# Patient Record
Sex: FEMALE | Race: WHITE
[De-identification: names, ages, dates, MRNs, and addresses within clinical notes are randomized per-mention and may not be internally consistent; named-entity substitution may affect disease eponyms.]

---

## 2018-11-06 ENCOUNTER — HOSPITAL ENCOUNTER (INPATIENT)
Dept: HOSPITAL 36 - ER | Age: 73
LOS: 14 days | Discharge: NURSING, REHAB OR LONG-TERM CARE | DRG: 885 | End: 2018-11-20
Attending: PSYCHIATRY & NEUROLOGY | Admitting: PSYCHIATRY & NEUROLOGY
Payer: MEDICARE

## 2018-11-06 VITALS — SYSTOLIC BLOOD PRESSURE: 121 MMHG | DIASTOLIC BLOOD PRESSURE: 54 MMHG

## 2018-11-06 DIAGNOSIS — E78.5: ICD-10-CM

## 2018-11-06 DIAGNOSIS — N18.9: ICD-10-CM

## 2018-11-06 DIAGNOSIS — K21.9: ICD-10-CM

## 2018-11-06 DIAGNOSIS — F03.90: ICD-10-CM

## 2018-11-06 DIAGNOSIS — F29: ICD-10-CM

## 2018-11-06 DIAGNOSIS — Z82.49: ICD-10-CM

## 2018-11-06 DIAGNOSIS — Z91.14: ICD-10-CM

## 2018-11-06 DIAGNOSIS — K27.9: ICD-10-CM

## 2018-11-06 DIAGNOSIS — F31.5: Primary | ICD-10-CM

## 2018-11-06 DIAGNOSIS — I12.9: ICD-10-CM

## 2018-11-06 DIAGNOSIS — Z83.3: ICD-10-CM

## 2018-11-06 DIAGNOSIS — E11.22: ICD-10-CM

## 2018-11-06 DIAGNOSIS — Z66: ICD-10-CM

## 2018-11-06 LAB
ALBUMIN SERPL-MCNC: 4 GM/DL (ref 3.7–5.3)
ALBUMIN/GLOB SERPL: 1.1 {RATIO} (ref 1–1.8)
ALP SERPL-CCNC: 72 U/L (ref 34–104)
ALT SERPL-CCNC: 13 U/L (ref 7–52)
ANION GAP SERPL CALC-SCNC: 13.4 MMOL/L (ref 7–16)
APAP SERPL-MCNC: < 10 UG/ML (ref 10–30)
AST SERPL-CCNC: 15 U/L (ref 13–39)
BASOPHILS # BLD AUTO: 0.1 TH/CUMM (ref 0–0.2)
BASOPHILS NFR BLD AUTO: 1 % (ref 0–2)
BILIRUB SERPL-MCNC: 0.3 MG/DL (ref 0.3–1)
BUN SERPL-MCNC: 25 MG/DL (ref 7–25)
CALCIUM SERPL-MCNC: 9.9 MG/DL (ref 8.6–10.3)
CHLORIDE SERPL-SCNC: 106 MEQ/L (ref 98–107)
CHOLEST SERPL-MCNC: 215 MG/DL (ref ?–200)
CO2 SERPL-SCNC: 26.2 MEQ/L (ref 21–31)
CREAT SERPL-MCNC: 1 MG/DL (ref 0.6–1.2)
EOSINOPHIL # BLD AUTO: 0.4 TH/CMM (ref 0.1–0.4)
EOSINOPHIL NFR BLD AUTO: 3.2 % (ref 0–5)
ERYTHROCYTE [DISTWIDTH] IN BLOOD BY AUTOMATED COUNT: 14.3 % (ref 11.5–20)
GLOBULIN SER-MCNC: 3.6 GM/DL
GLUCOSE SERPL-MCNC: 118 MG/DL (ref 70–105)
HCT VFR BLD CALC: 36.8 % (ref 41–60)
HDLC SERPL-MCNC: 45 MG/DL (ref 23–92)
HGB BLD-MCNC: 12.1 GM/DL (ref 12–16)
LYMPHOCYTE AB SER FC-ACNC: 4.9 TH/CMM (ref 1.5–3)
LYMPHOCYTES NFR BLD AUTO: 39.5 % (ref 20–50)
MCH RBC QN AUTO: 31.7 PG (ref 27–31)
MCHC RBC AUTO-ENTMCNC: 32.9 PG (ref 28–36)
MCV RBC AUTO: 96.4 FL (ref 81–100)
MONOCYTES # BLD AUTO: 0.8 TH/CMM (ref 0.3–1)
MONOCYTES NFR BLD AUTO: 6.3 % (ref 2–10)
NEUTROPHILS # BLD: 6.1 TH/CMM (ref 1.8–8)
NEUTROPHILS NFR BLD AUTO: 50 % (ref 40–80)
PLATELET # BLD: 375 TH/CMM (ref 150–400)
PMV BLD AUTO: 9.2 FL
POTASSIUM SERPL-SCNC: 3.6 MEQ/L (ref 3.5–5.1)
RBC # BLD AUTO: 3.82 MIL/CMM (ref 3.8–5.2)
SALICYLATES SERPL-MCNC: < 25 MG/L (ref 30–100)
SODIUM SERPL-SCNC: 142 MEQ/L (ref 136–145)
TRIGL SERPL-MCNC: 241 MG/DL (ref ?–150)
WBC # BLD AUTO: 12.3 TH/CMM (ref 4.8–10.8)

## 2018-11-06 PROCEDURE — Z7610: HCPCS

## 2018-11-06 RX ADMIN — INSULIN ASPART SCH: 100 INJECTION, SOLUTION INTRAVENOUS; SUBCUTANEOUS at 22:26

## 2018-11-06 NOTE — HISTORY & PHYSICAL
ADMIT DATE:  11/06/2018



HISTORY OF PRESENT ILLNESS:  The patient is a 72-year-old female with long

history of diabetes mellitus, hypertension, hyperlipidemia, dementia, psychosis,

admitted to [facility] under Dr. Preston's service for

evaluation and treatment.  The patient is a poor historian.  No fever, no

chills, no nausea, no vomiting.  The patient has been very agitated.



PAST MEDICAL HISTORY:  Significant for diabetes mellitus, hypertension,

hyperlipidemia, dementia, psychosis.



PAST SURGICAL HISTORY:  No recent surgery.



ALLERGIES:  None.



MEDICATIONS:  Follow admission reconciliation.



SOCIAL HISTORY:  No smoking, no alcohol, no drugs.



FAMILY HISTORY:  Noncontributory.



REVIEW OF SYSTEMS:

RENAL SYSTEM:  No history of chronic renal disorder.

CARDIOVASCULAR SYSTEM:  No coronary artery disease.

ENDOCRINE SYSTEM:  She has history of diabetes mellitus.

GASTROINTESTINAL SYSTEM:  No upper or lower gastrointestinal bleed.

NEUROLOGICAL SYSTEM:  No seizure disorder.

MUSCULOSKELETAL SYSTEM:  No muscular dystrophy.

HEMATOLOGAL SYSTEM:  No bleeding tendencies.

RESPIRATORY SYSTEM:  No asthma.

GENITOURINARY:  No dysuria or hematuria.



PHYSICAL EXAMINATION:

GENERAL:  She is awake, not coherent.

VITAL SIGNS:  Temperature ____, heart rate 71, blood pressure 167/90.

HEENT:  Normocephalic.  Pupils reactive to light and accommodation.  Sclerae

clear.

NECK:  Supple.  Negative for lymphadenopathy, JVD or bruit.

CHEST:  Entry of air bilaterally normal.  No rhonchi or wheezing.

HEART:  S1, S2 normal.  No murmur or gallop rhythm.

ABDOMEN:  Soft, bowel sounds positive.

EXTREMITIES:  No edema.

NEUROLOGIC:  She is awake, not coherent.  No focal motor or sensory deficit.



LABORATORY DATA:  White blood cell 12.3, hemoglobin 36.8, platelets 31.7. 

Sodium 142, potassium 3.6, BUN 25, creatinine 2.0, glucose 118, cholesterol 215.



ASSESSMENT:

1.  Diabetes mellitus.

2.  Hypertension.

3.  Hyperlipidemia.

4.  Psychosis.

5.  Dementia.



PLAN:  The patient will continue on current medication.  The patient was

admitted to the hospital under Dr. Preston's service.  Medical problem

addressed during this hospitalization:  Psychosis and dementia.  Medical

problems addressed at discharge:  Diabetes mellitus, hypertension,

hyperlipidemia.  The patient is medically stable for activity.



Thank you, Dr. Preston, for asking me to see your patient.  We will collect

urine for the UA, culture and sensitivity.  Also, we will start the patient on

sliding scale with regular insulin a.c. and at bedtime.  The patient is a full

code.





DD: 11/06/2018 20:36

DT: 11/06/2018 22:39

JOB# [id number]  [id number]

## 2018-11-06 NOTE — ED PHYSICIAN CHART
ED Chief Complaint/HPI





- Patient Information


Date Seen:: 11/06/18


Time Seen:: 17:25


Chief Complaint:: Agitation


History of Present Illness:: 





onset x 2 days of agitation and hostile behavior; no report of trauma, LOC, ALOC

, AMS, H/As, neck pain, C/P, SOB, SIs, Abd. Pain, A/N/V/D/C, fever, chills, or 

urinary s/s


Historian:: Patient, EMS


Review:: Nurse's Note Reviewed, Old Chart Reviewed, EMS run form Reviewed





ED Review of Systems





- Review of Systems


General/Constitutional: No fever, No chills, No weight loss, No weakness, No 

diaphoresis, No edema, No loss of appetite


Skin: No skin lesions, No rash, No bruising


Head: No headache, No light-headedness


Eyes: No loss of vision, No pain, No diplopia


ENT: No earache, No nasal drainage, No sore throat, No tinnitus


Neck: No neck pain, No swelling, No thyromegaly, No stiffness, No mass noted


Cardio Vascular: No chest pain, No palpitations, No PND, No orthopnea, No edema


Pulmonary: No SOB, No cough, No sputum, No wheezing


GI: No nausea, No vomiting, No diarrhea, No pain, No melena, No hematochezia, 

No constipation, No hematemesis


G/U: No dysuria, No frequency, No hematuria, No nacturia


Ob/Gyn: No vaginal discharge, No abnormal vaginal bleed, No contraction


Musculoskeletal: No bone or joint pain, No back pain, No muscle pain


Endocrine: No polyuria, No polydipsia


Psychiatric: Prior psych history, Depression, Anxiety, No suicidal ideation, No 

homicidal ideation, No auditory hallucination, No visual hallucination


Hematopoietic: No bruising, No lymphadenopathy


Allergic/Immuno: No urticaria, No angioedema


Neurological: No syncope, No focal symptoms, No weakness, No paresthesia, No 

headache, No seizure, No dizziness, Confusion, No vertigo





ED Past Medical History





- Past Medical History


Obtainable: Yes


Past Medical History: HTN, DM, PUD/GERD, ESRD, Dementia


Family History: Diabetes Melitus, HTN


Social History: Non Smoker, No Alcohol, No Drug Use, Single, Care Facility


Surgical History: None


Psychiatricy History: Depression, Bipolar, Dementia


Medication: Reviewed





Family Medical History





- Family Member


  ** Mother


History Unknown: Yes





ED Physical Exam





- Physical Examination


General/Constitutional: Awake, Well-developed, well-nourished, Alert, No 

distress, GCS 15, Non-toxic appearing, Ambulatory


Head: Atraumatic


Eyes: Lids, conjuctiva normal, PERRL, EOMI


Skin: Nl inspection, No rash, No skin lesions, No ecchymosis, Well hydrated, No 

lymphadenopathy


ENMT: External ears, nose nl, TM canals nl, Nasal exam nl, Lips, teeth, gums nl

, Oropharynx nl, Tonsils nl


Neck: Nontender, Full ROM w/o pain, No JVD, No nuchal rigidity, No bruit, No 

mass, No stridor


Respiratory: Nl effort/Exclusion, Clear to Auscultation, No Wheeze/Rhonchi/Rales


Cardio Vascular: RRR, No murmur, gallop, rubs, NL S1 S2, Carotid/Femoral/Distal 

pulses equal bilaterally


GI: No tenderness/rebounding/guarding, No organomegaly, No hernia, Normal BS's, 

Nondistended, No mass/bruits, No McBurney tenderness


: No CVA tenderness


Extremities: No tenderness or effusion, Full ROM, normal strength in all 

extremities, No edema, Normal digits & nails


Neuro/Psych: Alert/oriented, DTR's symmetric, Normal sensory exam, Normal motor 

strength, Judgement/insight normal, Mood normal, Normal gait, No focal deficits


Other Neuro/Psych comments:: 





+ Psychomotor Agitation; no SIs; Mood/Affect: Labile


Misc: Normal back, No paraspinal tenderness





ED Labs/Radiology/EKG Results





- Lab Results


Comments:: 





Reviewed





- EKG Interpretations


EKG Time:: 17:35


Rate & Rhythm: 76; NSR


Comments:: 





non-specific st-t changes





ED Septic Shock





- .


Is Septic Shock (SBP<90, OR Lactate>4 mmol\L) present?: No





ED Reassessment (Disposition)





- Reassessment


Reassessment Condition:: Improved





- Diagnosis


Diagnosis:: 





Agitation; Psychosis; Dementia; Medical Clearance; Bipolar Disorder





- Aftercare/Follow up Instructions


Aftercare/Follow-Up Instructions:: Counseled pt regarding lab results/diagnosis 

& need follow up, Counseled pt & family regarding lab results/diagnosis & need 

follow up





- Patient Disposition


Discharge/Transfer:: Acute Care w/in this hosp


Admitted to:: [facility]


Condition at Disposition:: Stable, Improved

## 2018-11-07 LAB
CHOLEST SERPL-MCNC: 200 MG/DL (ref ?–200)
HDLC SERPL-MCNC: 40 MG/DL (ref 23–92)
TRIGL SERPL-MCNC: 189 MG/DL (ref ?–150)

## 2018-11-07 RX ADMIN — INSULIN ASPART SCH: 100 INJECTION, SOLUTION INTRAVENOUS; SUBCUTANEOUS at 12:11

## 2018-11-07 RX ADMIN — INSULIN ASPART SCH: 100 INJECTION, SOLUTION INTRAVENOUS; SUBCUTANEOUS at 17:11

## 2018-11-07 RX ADMIN — INSULIN ASPART SCH: 100 INJECTION, SOLUTION INTRAVENOUS; SUBCUTANEOUS at 20:27

## 2018-11-07 RX ADMIN — INSULIN ASPART SCH: 100 INJECTION, SOLUTION INTRAVENOUS; SUBCUTANEOUS at 06:30

## 2018-11-07 NOTE — INTERNAL MEDICINE PROG NOTE
Internal Medicine Subjective





- Subjective


Service Date: 11/07/18


Patient seen and examined:: with staff


Patient is:: awake, verbal, in bed, talking, confused


Per staff patient has:: no adverse event





Internal Medicine Objective





- Results


Result Diagrams: 


 11/06/18 17:19





 11/06/18 17:19


Recent Labs: 


 Laboratory Last Values











WBC  12.3 Th/cmm (4.8-10.8)  H  11/06/18  17:19    


 


RBC  3.82 Mil/cmm (3.80-5.20)   11/06/18  17:19    


 


Hgb  12.1 gm/dL (12-16)   11/06/18  17:19    


 


Hct  36.8 % (41.0-60)  L  11/06/18  17:19    


 


MCV  96.4 fl ()   11/06/18  17:19    


 


MCH  31.7 pg (27.0-31.0)  H  11/06/18  17:19    


 


MCHC Differential  32.9 pg (28.0-36.0)   11/06/18  17:19    


 


RDW  14.3 % (11.5-20.0)   11/06/18  17:19    


 


Plt Count  375 Th/cmm (150-400)   11/06/18  17:19    


 


MPV  9.2 fl  11/06/18  17:19    


 


Neutrophils %  50.0 % (40.0-80.0)   11/06/18  17:19    


 


Lymphocytes %  39.5 % (20.0-50.0)   11/06/18  17:19    


 


Monocytes %  6.3 % (2.0-10.0)   11/06/18  17:19    


 


Eosinophils %  3.2 % (0.0-5.0)   11/06/18  17:19    


 


Basophils %  1.0 % (0.0-2.0)   11/06/18  17:19    


 


Sodium  142 mEq/L (136-145)   11/06/18  17:19    


 


Potassium  3.6 mEq/L (3.5-5.1)   11/06/18  17:19    


 


Chloride  106 mEq/L ()   11/06/18  17:19    


 


Carbon Dioxide  26.2 mEq/L (21.0-31.0)   11/06/18  17:19    


 


Anion Gap  13.4  (7.0-16.0)   11/06/18  17:19    


 


BUN  25 mg/dL (7-25)   11/06/18  17:19    


 


Creatinine  1.0 mg/dL (0.6-1.2)   11/06/18  17:19    


 


Est GFR ( Amer)  TNP   11/06/18  17:19    


 


Est GFR (Non-Af Amer)  TNP   11/06/18  17:19    


 


BUN/Creatinine Ratio  25.0   11/06/18  17:19    


 


Glucose  118 mg/dL ()  H  11/06/18  17:19    


 


POC Glucose  147 MG/DL (70 - 105)  H  11/07/18  19:45    


 


Calcium  9.9 mg/dL (8.6-10.3)   11/06/18  17:19    


 


Total Bilirubin  0.3 mg/dL (0.3-1.0)   11/06/18  17:19    


 


AST  15 U/L (13-39)   11/06/18  17:19    


 


ALT  13 U/L (7-52)   11/06/18  17:19    


 


Alkaline Phosphatase  72 U/L ()   11/06/18  17:19    


 


Troponin I  0.01 ng/mL (0.01-0.05)   11/06/18  17:19    


 


Total Protein  7.6 gm/dL (6.0-8.3)   11/06/18  17:19    


 


Albumin  4.0 gm/dL (3.7-5.3)   11/06/18  17:19    


 


Globulin  3.6 gm/dL  11/06/18  17:19    


 


Albumin/Globulin Ratio  1.1  (1.0-1.8)   11/06/18  17:19    


 


Triglycerides  189 mg/dL (<150)  H  11/07/18  06:05    


 


Cholesterol  200 mg/dL (<200)   11/07/18  06:05    


 


LDL Cholesterol Direct  143 mg/dL ()   11/07/18  06:05    


 


HDL Cholesterol  40 mg/dL (23-92)   11/07/18  06:05    


 


TSH  0.48 uIU/ml (0.34-5.60)   11/06/18  17:19    


 


Salicylates  < 25.0 mg/L (30.0-100.0)  L  11/06/18  17:19    


 


Acetaminophen  < 10.0 ug/mL (10.0-30.0)  L  11/06/18  17:19    


 


Ethyl Alcohol  < 10 mg/dL (0-10)   11/06/18  17:19    














- Physical Exam


Vitals and I&O: 


 Vital Signs











Temp  98.1 F   11/07/18 15:58


 


Pulse  79   11/07/18 15:58


 


Resp  20   11/07/18 15:58


 


BP  143/88   11/07/18 15:58


 


Pulse Ox  97   11/07/18 15:58








 Intake & Output











 11/07/18 11/07/18 11/08/18





 06:59 18:59 06:59


 


Intake Total 60  


 


Balance 60  


 


Intake:   


 


  Oral 60  


 


Other:   


 


  # Voids 1  


 


  # Bowel Movements 1  











Active Medications: 


Current Medications





Acetaminophen (Tylenol)  650 mg PO Q4HR PRN


   PRN Reason: Mild Pain / Temp above 100


   Stop: 01/05/19 20:40


   Last Admin: 11/07/18 11:11 Dose:  650 mg


Al Hydrox/Mg Hydrox/Simethicone (Maalox)  30 ml PO Q4HR PRN


   PRN Reason: GI DISTRESS


   Stop: 01/05/19 20:40


Amlodipine Besylate (Norvasc)  10 mg PO DAILY [facility]


   Stop: 01/06/19 08:59


   Last Admin: 11/07/18 08:39 Dose:  Not Given


Docusate Sodium (Colace)  100 mg PO BID [facility]


   Stop: 01/06/19 08:59


   Last Admin: 11/07/18 17:14 Dose:  100 mg


Heparin Sodium (Porcine) (Heparin)  5,000 units SUBQ Q12HR [facility]


   Stop: 01/05/19 21:59


   Last Admin: 11/07/18 08:39 Dose:  5,000 units


Insulin Aspart (Novolog Insulin Sliding Scale)  0 units SUBQ ACHS [facility]; Protocol


   Stop: 01/05/19 20:59


   Last Admin: 11/07/18 17:11 Dose:  Not Given


Lorazepam (Ativan)  0.5 mg PO Q4H PRN; Protocol


   PRN Reason: Anxiety


   Stop: 01/05/19 20:34


   Last Admin: 11/07/18 17:13 Dose:  0.5 mg


Magnesium Hydroxide (Milk Of Magnesia)  30 ml PO Q4H PRN


   PRN Reason: Constipation


   Stop: 01/05/19 20:34


Metformin HCl (Glucophage)  500 mg PO BID [facility]


   Stop: 01/06/19 08:59


   Last Admin: 11/07/18 17:14 Dose:  500 mg


Mirtazapine (Remeron)  7.5 mg PO HS [facility]; Protocol


   Stop: 01/06/19 20:59


Multivitamins/Vitamin C (Theragran)  1 tab PO DAILY [facility]


   Stop: 01/06/19 08:59


   Last Admin: 11/07/18 08:39 Dose:  1 tab


Quetiapine Fumarate (Seroquel)  25 mg PO BID [facility]; Protocol


   Stop: 01/06/19 08:59


   Last Admin: 11/07/18 17:14 Dose:  25 mg


Quetiapine Fumarate (Seroquel)  100 mg PO HS [facility]; Protocol


   Stop: 01/05/19 20:59


   Last Admin: 11/06/18 22:07 Dose:  100 mg


Zolpidem Tartrate (Ambien)  5 mg PO HS PRN


   PRN Reason: Insomnia


   Stop: 01/05/19 20:40








General: demented


HEENT: NC/AT, PERRLA, EOMI, anicteric sclerae, throat clear


Neck: Supple, No JVD, No thyromegaly, +2 carotid pulse wo bruit, No LAD, + JVD


Cardiovascular: Normal S1, Normal S2, without murmur


Abdomen: soft, non-tender, non-distended


Extremities: clear


Neurological: no change





Internal Medicine Assmt/Plan





- Assessment


Assessment: 





1.DM.


2.HTN.


3.HYPERLIPIDEMIA.


4.DEMENTIA.





- Plan


Plan: 





CONTINUE ON CURRENT MEDICATION AND DIET.

## 2018-11-07 NOTE — HISTORY & PHYSICAL
ADMIT DATE:  11/06/2018



IDENTIFYING INFORMATION:  The patient is a 72-year-old female.



CHIEF COMPLAINT:  No answer.



HISTORY OF PRESENT ILLNESS:  The patient was admitted on a hold.  The patient

was yelling and screaming.  According to the hold, very confused, making

nonsensical statements, refusing to sign voluntary admission.  She was for psych

evaluation.  She has been noncompliant with her medication.  The patient was a

total care, very agitated, confused, and unable to state a safe plan for

self-care, and redirectable.  She had to be medicated.  When I tried to talk to

her, she was somewhat sedated because she got medicated.



PAST PSYCHIATRIC HISTORY:  Dementia and bipolar disorder.



PAST MEDICAL HISTORY:  The patient has hypertension, hyperlipidemia, and

diabetes mellitus.



MEDICATIONS:  The patient's medication was restarted, which is Remeron 7.5 mg at

bedtime, Seroquel 25 mg twice a day and 100 mg at bedtime.  Also, Dr. Alberto Garcia currently ordered the emergency medication for her, which was Haldol 5

mg with Benadryl 50 mg.  I was unable to get any information from her, but she

is a well-known patient with a history of dementia and bipolar disorder.



FAMILY AND SOCIAL HISTORY:  Unobtainable.  She apparently have children and

grandchildren, who are involved in her treatment.



MENTAL STATUS EXAMINATION:  The patient is appropriately dressed, not well

groomed.  She was somewhat sedated; however, upon admission, she was very

agitated, confused, refusing care, yelling, screaming, and refusing care.  From

my history with her, I know, her long and short-term memory is poor.  Her

insight and judgment, she has no idea she has a problem.  Judgment is poor with

her behavior.  She has not been sleeping or eating well.



IMPRESSION:  Psychosis not otherwise specified with bipolar disorder with

psychosis; dementia.



MEDICAL DIAGNOSES:  Hypertension and diabetes mellitus.



INITIAL TREATMENT PLAN:  The patient will be restarted on medication, we will

adjust medications as needed.  We will do group therapy and milieu therapy.



ESTIMATED LENGTH OF STAY:  7-10 days.



DISCHARGE CRITERIA:  Decrease psychosis and agitation.  Discharge outpatient.





DD: 11/07/2018 08:36

DT: 11/07/2018 09:43

[facility]# [id number]  [id number]

## 2018-11-08 RX ADMIN — INSULIN ASPART SCH: 100 INJECTION, SOLUTION INTRAVENOUS; SUBCUTANEOUS at 17:02

## 2018-11-08 RX ADMIN — INSULIN ASPART SCH: 100 INJECTION, SOLUTION INTRAVENOUS; SUBCUTANEOUS at 06:31

## 2018-11-08 RX ADMIN — INSULIN ASPART SCH UNITS: 100 INJECTION, SOLUTION INTRAVENOUS; SUBCUTANEOUS at 12:09

## 2018-11-08 RX ADMIN — INSULIN ASPART SCH: 100 INJECTION, SOLUTION INTRAVENOUS; SUBCUTANEOUS at 21:00

## 2018-11-08 NOTE — INTERNAL MEDICINE PROG NOTE
Internal Medicine Subjective





- Subjective


Service Date: 11/08/18


Patient seen and examined:: with staff


Patient is:: awake, verbal, in bed, talking, confused


Per staff patient has:: no adverse event





Internal Medicine Objective





- Results


Result Diagrams: 


 11/06/18 17:19





 11/06/18 17:19


Recent Labs: 


 Laboratory Last Values











WBC  12.3 Th/cmm (4.8-10.8)  H  11/06/18  17:19    


 


RBC  3.82 Mil/cmm (3.80-5.20)   11/06/18  17:19    


 


Hgb  12.1 gm/dL (12-16)   11/06/18  17:19    


 


Hct  36.8 % (41.0-60)  L  11/06/18  17:19    


 


MCV  96.4 fl ()   11/06/18  17:19    


 


MCH  31.7 pg (27.0-31.0)  H  11/06/18  17:19    


 


MCHC Differential  32.9 pg (28.0-36.0)   11/06/18  17:19    


 


RDW  14.3 % (11.5-20.0)   11/06/18  17:19    


 


Plt Count  375 Th/cmm (150-400)   11/06/18  17:19    


 


MPV  9.2 fl  11/06/18  17:19    


 


Neutrophils %  50.0 % (40.0-80.0)   11/06/18  17:19    


 


Lymphocytes %  39.5 % (20.0-50.0)   11/06/18  17:19    


 


Monocytes %  6.3 % (2.0-10.0)   11/06/18  17:19    


 


Eosinophils %  3.2 % (0.0-5.0)   11/06/18  17:19    


 


Basophils %  1.0 % (0.0-2.0)   11/06/18  17:19    


 


Sodium  142 mEq/L (136-145)   11/06/18  17:19    


 


Potassium  3.6 mEq/L (3.5-5.1)   11/06/18  17:19    


 


Chloride  106 mEq/L ()   11/06/18  17:19    


 


Carbon Dioxide  26.2 mEq/L (21.0-31.0)   11/06/18  17:19    


 


Anion Gap  13.4  (7.0-16.0)   11/06/18  17:19    


 


BUN  25 mg/dL (7-25)   11/06/18  17:19    


 


Creatinine  1.0 mg/dL (0.6-1.2)   11/06/18  17:19    


 


Est GFR ( Amer)  TNP   11/06/18  17:19    


 


Est GFR (Non-Af Amer)  TNP   11/06/18  17:19    


 


BUN/Creatinine Ratio  25.0   11/06/18  17:19    


 


Glucose  118 mg/dL ()  H  11/06/18  17:19    


 


POC Glucose  127 MG/DL (70 - 105)  H  11/08/18  22:03    


 


Calcium  9.9 mg/dL (8.6-10.3)   11/06/18  17:19    


 


Total Bilirubin  0.3 mg/dL (0.3-1.0)   11/06/18  17:19    


 


AST  15 U/L (13-39)   11/06/18  17:19    


 


ALT  13 U/L (7-52)   11/06/18  17:19    


 


Alkaline Phosphatase  72 U/L ()   11/06/18  17:19    


 


Troponin I  0.01 ng/mL (0.01-0.05)   11/06/18  17:19    


 


Total Protein  7.6 gm/dL (6.0-8.3)   11/06/18  17:19    


 


Albumin  4.0 gm/dL (3.7-5.3)   11/06/18  17:19    


 


Globulin  3.6 gm/dL  11/06/18  17:19    


 


Albumin/Globulin Ratio  1.1  (1.0-1.8)   11/06/18  17:19    


 


Triglycerides  189 mg/dL (<150)  H  11/07/18  06:05    


 


Cholesterol  200 mg/dL (<200)   11/07/18  06:05    


 


LDL Cholesterol Direct  143 mg/dL ()   11/07/18  06:05    


 


HDL Cholesterol  40 mg/dL (23-92)   11/07/18  06:05    


 


TSH  0.48 uIU/ml (0.34-5.60)   11/06/18  17:19    


 


Salicylates  < 25.0 mg/L (30.0-100.0)  L  11/06/18  17:19    


 


Acetaminophen  < 10.0 ug/mL (10.0-30.0)  L  11/06/18  17:19    


 


Ethyl Alcohol  < 10 mg/dL (0-10)   11/06/18  17:19    














- Physical Exam


Vitals and I&O: 


 Vital Signs











Temp  98.2 F   11/08/18 21:06


 


Pulse  82   11/08/18 21:06


 


Resp  19   11/08/18 21:06


 


BP  149/64   11/08/18 21:06


 


Pulse Ox  96   11/08/18 21:06








 Intake & Output











 11/08/18 11/08/18 11/09/18





 06:59 18:59 06:59


 


Intake Total  120 


 


Balance  120 


 


Intake:   


 


  Oral  120 


 


Other:   


 


  # Voids  3 


 


  # Bowel Movements  0 











Active Medications: 


Current Medications





Acetaminophen (Tylenol)  650 mg PO Q4HR PRN


   PRN Reason: Mild Pain / Temp above 100


   Stop: 01/05/19 20:40


   Last Admin: 11/07/18 11:11 Dose:  650 mg


Al Hydrox/Mg Hydrox/Simethicone (Maalox)  30 ml PO Q4HR PRN


   PRN Reason: GI DISTRESS


   Stop: 01/05/19 20:40


Amlodipine Besylate (Norvasc)  10 mg PO DAILY [facility]


   Stop: 01/06/19 08:59


   Last Admin: 11/08/18 10:00 Dose:  10 mg


Docusate Sodium (Colace)  100 mg PO BID [facility]


   Stop: 01/06/19 08:59


   Last Admin: 11/08/18 16:39 Dose:  100 mg


Heparin Sodium (Porcine) (Heparin)  5,000 units SUBQ Q12HR [facility]


   Stop: 01/05/19 21:59


   Last Admin: 11/08/18 21:47 Dose:  5,000 units


Insulin Aspart (Novolog Insulin Sliding Scale)  0 units SUBQ ACHS [facility]; Protocol


   Stop: 01/05/19 20:59


   Last Admin: 11/08/18 17:02 Dose:  Not Given


Lorazepam (Ativan)  0.5 mg PO Q4H PRN; Protocol


   PRN Reason: Anxiety


   Stop: 01/05/19 20:34


   Last Admin: 11/08/18 21:41 Dose:  0.5 mg


Magnesium Hydroxide (Milk Of Magnesia)  30 ml PO Q4H PRN


   PRN Reason: Constipation


   Stop: 01/05/19 20:34


Metformin HCl (Glucophage)  500 mg PO BID [facility]


   Stop: 01/06/19 08:59


   Last Admin: 11/08/18 16:39 Dose:  500 mg


Mirtazapine (Remeron)  7.5 mg PO HS [facility]; Protocol


   Stop: 01/06/19 20:59


   Last Admin: 11/08/18 21:41 Dose:  7.5 mg


Multivitamins/Vitamin C (Theragran)  1 tab PO DAILY [facility]


   Stop: 01/06/19 08:59


   Last Admin: 11/08/18 10:00 Dose:  1 tab


Quetiapine Fumarate (Seroquel)  25 mg PO BID [facility]; Protocol


   Stop: 01/06/19 08:59


   Last Admin: 11/08/18 16:40 Dose:  25 mg


Quetiapine Fumarate (Seroquel)  100 mg PO HS [facility]; Protocol


   Stop: 01/05/19 20:59


   Last Admin: 11/08/18 21:41 Dose:  100 mg


Zolpidem Tartrate (Ambien)  5 mg PO HS PRN


   PRN Reason: Insomnia


   Stop: 01/05/19 20:40


   Last Admin: 11/08/18 21:40 Dose:  5 mg








General: demented


HEENT: NC/AT, PERRLA, EOMI, anicteric sclerae, throat clear


Neck: Supple, No JVD, No thyromegaly, +2 carotid pulse wo bruit, No LAD, + JVD


Cardiovascular: Normal S1, Normal S2, without murmur


Abdomen: soft, non-tender, non-distended


Extremities: clear


Neurological: no change





Internal Medicine Assmt/Plan





- Assessment


Assessment: 





1.DM.


2.HTN.


3.HYPERLIPIDEMIA.


4.DEMENTIA.





- Plan


Plan: 





CONTINUE ON CURRENT MEDICATION AND DIET.

## 2018-11-08 NOTE — PROGRESS NOTES
DATE:  11/08/2018



Case was discussed with staff of the patient, reviewed records.  The patient

continues to be psychotic, yelling and screaming.  She was on the observation

room.  Continues to be unable to carry on a conversation or make safe plan for

self-care.  Continues to be unpredictable, impulsive, needing redirection.  The

patient was started back on her medication; however, the patient was

noncompliant with medication before that and I am not going to increase the dose

yet because of her age, give her more time.  We will continue outpatient group

therapy, milieu therapy, and adjust the medication as needed.





DD: 11/08/2018 12:26

DT: 11/08/2018 22:09

JOB# [id number]  [id number]

## 2018-11-09 RX ADMIN — INSULIN ASPART SCH UNITS: 100 INJECTION, SOLUTION INTRAVENOUS; SUBCUTANEOUS at 21:13

## 2018-11-09 RX ADMIN — INSULIN ASPART SCH: 100 INJECTION, SOLUTION INTRAVENOUS; SUBCUTANEOUS at 06:42

## 2018-11-09 RX ADMIN — INSULIN ASPART SCH: 100 INJECTION, SOLUTION INTRAVENOUS; SUBCUTANEOUS at 11:31

## 2018-11-09 RX ADMIN — INSULIN ASPART SCH: 100 INJECTION, SOLUTION INTRAVENOUS; SUBCUTANEOUS at 16:16

## 2018-11-09 NOTE — PROGRESS NOTES
DATE:  11/09/2018



Case was discussed with staff of the patient, reviewed records.  The patient

yesterday had to be medicated.  She was acting out, yelling, screaming, had to

be given Haldol 2 mg with Benadryl 25 mg.  The patient continues to have poor

insight, unpredictable, impulsive, needing redirection, demented, confused.  No

side effects with the medication, no sedation, no nausea, no extrapyramidal

symptoms.  She is also on Remeron 7.5 mg at bedtime, Seroquel 25 mg twice a day

and 100 mg at bedtime.  Her lab work showed only blood sugar being high at 126

this morning and we will continue outpatient group therapy, milieu therapy, and

adjust medications as needed.





DD: 11/09/2018 10:39

DT: 11/09/2018 13:07

JOB# [id number]  [id number]

## 2018-11-09 NOTE — INTERNAL MEDICINE PROG NOTE
Internal Medicine Subjective





- Subjective


Service Date: 18


Patient seen and examined:: with staff


Patient is:: awake, verbal, in bed, talking, confused


Per staff patient has:: no adverse event





Internal Medicine Objective





- Results


Result Diagrams: 


 18 17:19





 18 17:19


Recent Labs: 


 Laboratory Last Values











WBC  12.3 Th/cmm (4.8-10.8)  H  18  17:19    


 


RBC  3.82 Mil/cmm (3.80-5.20)   18  17:19    


 


Hgb  12.1 gm/dL (12-16)   18  17:19    


 


Hct  36.8 % (41.0-60)  L  18  17:19    


 


MCV  96.4 fl ()   18  17:19    


 


MCH  31.7 pg (27.0-31.0)  H  18  17:19    


 


MCHC Differential  32.9 pg (28.0-36.0)   18  17:19    


 


RDW  14.3 % (11.5-20.0)   18  17:19    


 


Plt Count  375 Th/cmm (150-400)   18  17:19    


 


MPV  9.2 fl  18  17:19    


 


Neutrophils %  50.0 % (40.0-80.0)   18  17:19    


 


Lymphocytes %  39.5 % (20.0-50.0)   18  17:19    


 


Monocytes %  6.3 % (2.0-10.0)   18  17:19    


 


Eosinophils %  3.2 % (0.0-5.0)   18  17:19    


 


Basophils %  1.0 % (0.0-2.0)   18  17:19    


 


Sodium  142 mEq/L (136-145)   18  17:19    


 


Potassium  3.6 mEq/L (3.5-5.1)   18  17:19    


 


Chloride  106 mEq/L ()   18  17:19    


 


Carbon Dioxide  26.2 mEq/L (21.0-31.0)   18  17:19    


 


Anion Gap  13.4  (7.0-16.0)   18  17:19    


 


BUN  25 mg/dL (7-25)   18  17:19    


 


Creatinine  1.0 mg/dL (0.6-1.2)   18  17:19    


 


Est GFR ( Amer)  TNP   18  17:19    


 


Est GFR (Non-Af Amer)  TNP   18  17:19    


 


BUN/Creatinine Ratio  25.0   18  17:19    


 


Glucose  118 mg/dL ()  H  18  17:19    


 


POC Glucose  155 MG/DL (70 - 105)  H  18  19:45    


 


Calcium  9.9 mg/dL (8.6-10.3)   18  17:19    


 


Total Bilirubin  0.3 mg/dL (0.3-1.0)   18  17:19    


 


AST  15 U/L (13-39)   18  17:19    


 


ALT  13 U/L (7-52)   18  17:19    


 


Alkaline Phosphatase  72 U/L ()   18  17:19    


 


Troponin I  0.01 ng/mL (0.01-0.05)   18  17:19    


 


Total Protein  7.6 gm/dL (6.0-8.3)   18  17:19    


 


Albumin  4.0 gm/dL (3.7-5.3)   18  17:19    


 


Globulin  3.6 gm/dL  18  17:19    


 


Albumin/Globulin Ratio  1.1  (1.0-1.8)   18  17:19    


 


Triglycerides  189 mg/dL (<150)  H  18  06:05    


 


Cholesterol  200 mg/dL (<200)   18  06:05    


 


LDL Cholesterol Direct  143 mg/dL ()   18  06:05    


 


HDL Cholesterol  40 mg/dL (23-92)   18  06:05    


 


TSH  0.48 uIU/ml (0.34-5.60)   18  17:19    


 


Salicylates  < 25.0 mg/L (30.0-100.0)  L  18  17:19    


 


Acetaminophen  < 10.0 ug/mL (10.0-30.0)  L  18  17:19    


 


Ethyl Alcohol  < 10 mg/dL (0-10)   18  17:19    


 


RPR  NONREACTIVE  (NONREACTIVE)   18  17:19    














- Physical Exam


Vitals and I&O: 


 Vital Signs











Temp  98.1 F   18 20:40


 


Pulse  85   18 20:40


 


Resp  20   18 20:40


 


BP  146/93   18 20:40


 


Pulse Ox  97   18 20:40








 Intake & Output











 11/09/18 11/09/18 11/10/18





 06:59 18:59 06:59


 


Intake Total 120 860 


 


Balance 120 860 


 


Intake:   


 


  Oral 120 860 


 


Other:   


 


  # Voids 3 3 


 


  # Bowel Movements  0 


 


  Stool Characteristics  Soft 











Active Medications: 


Current Medications





Acetaminophen (Tylenol)  650 mg PO Q4HR PRN


   PRN Reason: Mild Pain / Temp above 100


   Stop: 19 20:40


   Last Admin: 18 11:11 Dose:  650 mg


Al Hydrox/Mg Hydrox/Simethicone (Maalox)  30 ml PO Q4HR PRN


   PRN Reason: GI DISTRESS


   Stop: 19 20:40


Amlodipine Besylate (Norvasc)  10 mg PO DAILY [facility]


   Stop: 19 08:59


   Last Admin: 18 09:38 Dose:  Not Given


Docusate Sodium (Colace)  100 mg PO BID [facility]


   Stop: 19 08:59


   Last Admin: 18 16:16 Dose:  Not Given


Heparin Sodium (Porcine) (Heparin)  5,000 units SUBQ Q12HR [facility]


   Stop: 19 21:59


   Last Admin: 18 21:09 Dose:  5,000 units


Insulin Aspart (Novolog Insulin Sliding Scale)  0 units SUBQ ACHS [facility]; Protocol


   Stop: 19 20:59


   Last Admin: 18 21:13 Dose:  2 units


Lorazepam (Ativan)  0.5 mg PO Q4H PRN; Protocol


   PRN Reason: Anxiety


   Stop: 19 20:34


   Last Admin: 18 21:08 Dose:  0.5 mg


Magnesium Hydroxide (Milk Of Magnesia)  30 ml PO Q4H PRN


   PRN Reason: Constipation


   Stop: 19 20:34


Metformin HCl (Glucophage)  500 mg PO BID [facility]


   Stop: 19 08:59


   Last Admin: 18 16:16 Dose:  500 mg


Mirtazapine (Remeron)  7.5 mg PO HS [facility]; Protocol


   Stop: 19 20:59


   Last Admin: 18 21:08 Dose:  7.5 mg


Multivitamins/Vitamin C (Theragran)  1 tab PO DAILY [facility]


   Stop: 19 08:59


   Last Admin: 18 09:37 Dose:  Not Given


Quetiapine Fumarate (Seroquel)  25 mg PO BID [facility]; Protocol


   Stop: 19 08:59


   Last Admin: 18 16:16 Dose:  25 mg


Quetiapine Fumarate (Seroquel)  100 mg PO HS [facility]; Protocol


   Stop: 19 20:59


   Last Admin: 18 21:09 Dose:  100 mg


Zolpidem Tartrate (Ambien)  5 mg PO HS PRN


   PRN Reason: Insomnia


   Stop: 19 20:40


   Last Admin: 18 21:09 Dose:  5 mg








General: demented


HEENT: NC/AT, PERRLA, EOMI, anicteric sclerae, throat clear


Neck: Supple, No JVD, No thyromegaly, +2 carotid pulse wo bruit, No LAD, + JVD


Cardiovascular: Normal S1, Normal S2, without murmur


Abdomen: soft, non-tender, non-distended


Extremities: clear


Neurological: no change





Internal Medicine Assmt/Plan





- Assessment


Assessment: 





1.DM.


2.HTN.


3.HYPERLIPIDEMIA.


4.DEMENTIA.





- Plan


Plan: 





CONTINUE ON CURRENT MEDICATION AND DIET.





Nutritional Asmnt/Malnutr-PDOC





- Dietary Evaluation


Malnutrition Findings (Please click <Entered> for more info): 








Nutritional Asmnt/Malnutrition                             Start:  18 12:

46


Text:                                                      Status: Complete    

  


Freq:                                                                          

  


Protocol:                                                                      

  


 Document     18 12:46  MARCO  (Rec: 18 12:55  MARCO  [email or web address])


 Nutritional Asmnt/Malnutrition


     Patient General Information


      Nutritional Screening                      Moderate Risk


      Diagnosis                                  psychosis


      Pertinent Medical Hx/Surgical Hx           HTN, DM, hyperlipidemia,


                                                 dementia, bipolar disorder,


                                                 psychosis, PUD/GERD, ESRD


      Subjective Information                     Pt sleeping at time of visit.


                                                 Nursing noted PO intake: ~75%.


      Current Diet Order/ Nutrition Support      [facility] soft chopped, PAULIE, CCHO


      Pertinent Medications                      colace, heparin, novolog,


                                                 metformin, remeron, theragran,


                                                 seroquel


      Pertinent Labs                             : -126


                                                 : -129


                                                  glucose 118


     Nutritional Hx/Data


      Height                                     1.7 m


      Height (Calculated Centimeters)            170.2


      Current Weight (lbs)                       77.111 kg


      Weight (Calculated Kilograms)              77.1


      Weight (Calculated Grams)                  14461.7


      Ideal Body Weight                          135 lb


      Body Mass Index (BMI)                      26.6


      Weight Status                              Overweight


     GI Symptoms


      GI Symptoms                                None


      Last BM                                    


      Difficult in:                              None


      Food Allergies                             No


      Skin Integrity/Comment:                    glenn hope 17


      Current %PO                                Good (%)


     Estimated Nutritional Goals


      BEE in Kcals:                              Using Current wt


      Calories/Kcals/Kg                          23-27


      Kcals Calculated                           0083-5824


      Protein:                                   Using Current wt


      Protein g/k.8-1


      Protein Calculated                         62-77 g


      Fluid: ml                                  9922-0163 (1 ml/kcal)


     Nutritional Problem


      1. Problem


       Problem                                   altered nutrition related lab


                                                 values


       Etiology                                  hyperglycemia, endocrine


                                                 dysfunction


       Signs/Symptoms:                           -126


     Malnutrition Alert


      Is there a minimum of two criteria         No


       selected?                                 


       Query Text:Check all the applicable       


       criteria. A minimum of two criteria are   


       recommended for diagnosis of either       


       severe or non-severe malnutrition.        


     Malnutrition Related to Morbid Obesity


      Malnutrition related to morbid obesity     No


     Intervention/Recommendation


      Comments                                   1. Continue with [facility] soft


                                                 chopped, PAULIE, CCHO diet as


                                                 ordered


                                                 2. Monitor PO intake, wt, labs


                                                 and skin integrity


                                                 3. F/U as moderate risk in 3-5


                                                 days, -14


     Expected Outcomes/Goals


      Expected Outcomes/Goals                    1. PO intake to meet at least


                                                 75% of all meals


                                                 2. Wt stability, skin to


                                                 remain intact, and nutrition


                                                 related labs to approach


                                                 normal limits


                                                 Reviewed by Annette Whyte RD

## 2018-11-10 RX ADMIN — INSULIN ASPART SCH: 100 INJECTION, SOLUTION INTRAVENOUS; SUBCUTANEOUS at 06:36

## 2018-11-10 RX ADMIN — INSULIN ASPART SCH: 100 INJECTION, SOLUTION INTRAVENOUS; SUBCUTANEOUS at 21:31

## 2018-11-10 RX ADMIN — INSULIN ASPART SCH: 100 INJECTION, SOLUTION INTRAVENOUS; SUBCUTANEOUS at 11:33

## 2018-11-10 RX ADMIN — INSULIN ASPART SCH: 100 INJECTION, SOLUTION INTRAVENOUS; SUBCUTANEOUS at 16:40

## 2018-11-10 NOTE — GENERAL PROGRESS NOTE
Subjective





- Review of Systems


Service Date: 11/10/18


Subjective: 





resting comfortably in bed


non participatory





Objective





- Results


Result Diagrams: 


 18 17:19





 18 17:19


Recent Labs: 


 Laboratory Last Values











WBC  12.3 Th/cmm (4.8-10.8)  H  18  17:19    


 


RBC  3.82 Mil/cmm (3.80-5.20)   18  17:19    


 


Hgb  12.1 gm/dL (12-16)   18  17:19    


 


Hct  36.8 % (41.0-60)  L  18  17:19    


 


MCV  96.4 fl ()   18  17:19    


 


MCH  31.7 pg (27.0-31.0)  H  18  17:19    


 


MCHC Differential  32.9 pg (28.0-36.0)   18  17:19    


 


RDW  14.3 % (11.5-20.0)   18  17:19    


 


Plt Count  375 Th/cmm (150-400)   18  17:19    


 


MPV  9.2 fl  18  17:19    


 


Neutrophils %  50.0 % (40.0-80.0)   18  17:19    


 


Lymphocytes %  39.5 % (20.0-50.0)   18  17:19    


 


Monocytes %  6.3 % (2.0-10.0)   18  17:19    


 


Eosinophils %  3.2 % (0.0-5.0)   18  17:19    


 


Basophils %  1.0 % (0.0-2.0)   18  17:19    


 


Sodium  142 mEq/L (136-145)   18  17:19    


 


Potassium  3.6 mEq/L (3.5-5.1)   18  17:19    


 


Chloride  106 mEq/L ()   18  17:19    


 


Carbon Dioxide  26.2 mEq/L (21.0-31.0)   18  17:19    


 


Anion Gap  13.4  (7.0-16.0)   18  17:19    


 


BUN  25 mg/dL (7-25)   18  17:19    


 


Creatinine  1.0 mg/dL (0.6-1.2)   18  17:19    


 


Est GFR ( Amer)  TNP   18  17:19    


 


Est GFR (Non-Af Amer)  TNP   18  17:19    


 


BUN/Creatinine Ratio  25.0   18  17:19    


 


Glucose  118 mg/dL ()  H  18  17:19    


 


POC Glucose  127 MG/DL (70 - 105)  H  11/10/18  11:29    


 


Calcium  9.9 mg/dL (8.6-10.3)   18  17:19    


 


Total Bilirubin  0.3 mg/dL (0.3-1.0)   18  17:19    


 


AST  15 U/L (13-39)   18  17:19    


 


ALT  13 U/L (7-52)   18  17:19    


 


Alkaline Phosphatase  72 U/L ()   18  17:19    


 


Troponin I  0.01 ng/mL (0.01-0.05)   18  17:19    


 


Total Protein  7.6 gm/dL (6.0-8.3)   18  17:19    


 


Albumin  4.0 gm/dL (3.7-5.3)   18  17:19    


 


Globulin  3.6 gm/dL  18  17:19    


 


Albumin/Globulin Ratio  1.1  (1.0-1.8)   18  17:19    


 


Triglycerides  189 mg/dL (<150)  H  18  06:05    


 


Cholesterol  200 mg/dL (<200)   18  06:05    


 


LDL Cholesterol Direct  143 mg/dL ()   18  06:05    


 


HDL Cholesterol  40 mg/dL (23-92)   18  06:05    


 


TSH  0.48 uIU/ml (0.34-5.60)   18  17:19    


 


Salicylates  < 25.0 mg/L (30.0-100.0)  L  18  17:19    


 


Acetaminophen  < 10.0 ug/mL (10.0-30.0)  L  18  17:19    


 


Ethyl Alcohol  < 10 mg/dL (0-10)   18  17:19    


 


RPR  NONREACTIVE  (NONREACTIVE)   18  17:19    














- Physical Exam


Vitals and I&O: 


 Vital Signs











Temp  96.9 F   11/10/18 05:38


 


Pulse  64   11/10/18 09:43


 


Resp  18   11/10/18 05:38


 


BP  160/80   11/10/18 09:43


 


Pulse Ox  98   11/10/18 05:38








 Intake & Output











 11/09/18 11/10/18 11/10/18





 18:59 06:59 18:59


 


Intake Total 860  


 


Balance 860  


 


Intake:   


 


  Oral 860  


 


Other:   


 


  # Voids 3  


 


  # Bowel Movements 0  


 


  Stool Characteristics Soft  Soft











Active Medications: 


Current Medications





Acetaminophen (Tylenol)  650 mg PO Q4HR PRN


   PRN Reason: Mild Pain / Temp above 100


   Stop: 19 20:40


   Last Admin: 11/10/18 13:25 Dose:  650 mg


Al Hydrox/Mg Hydrox/Simethicone (Maalox)  30 ml PO Q4HR PRN


   PRN Reason: GI DISTRESS


   Stop: 19 20:40


Amlodipine Besylate (Norvasc)  10 mg PO DAILY [facility]


   Stop: 19 08:59


   Last Admin: 11/10/18 09:43 Dose:  10 mg


Calamine/Phenol (Calmoseptine)  1 appl TP PRN PRN


   PRN Reason: IAD 


   Stop: 19 05:59


Docusate Sodium (Colace)  100 mg PO BID [facility]


   Stop: 19 08:59


   Last Admin: 11/10/18 09:42 Dose:  100 mg


Heparin Sodium (Porcine) (Heparin)  5,000 units SUBQ Q12HR [facility]


   Stop: 19 21:59


   Last Admin: 11/10/18 09:42 Dose:  5,000 units


Insulin Aspart (Novolog Insulin Sliding Scale)  0 units SUBQ ACHS [facility]; Protocol


   Stop: 19 20:59


   Last Admin: 11/10/18 11:33 Dose:  Not Given


Lorazepam (Ativan)  0.5 mg PO Q4H PRN; Protocol


   PRN Reason: Anxiety


   Stop: 19 20:34


   Last Admin: 11/10/18 13:25 Dose:  0.5 mg


Magnesium Hydroxide (Milk Of Magnesia)  30 ml PO Q4H PRN


   PRN Reason: Constipation


   Stop: 19 20:34


Metformin HCl (Glucophage)  500 mg PO BID [facility]


   Stop: 19 08:59


   Last Admin: 11/10/18 09:42 Dose:  500 mg


Mirtazapine (Remeron)  7.5 mg PO HS [facility]; Protocol


   Stop: 19 20:59


   Last Admin: 18 21:08 Dose:  7.5 mg


Multivitamins/Vitamin C (Theragran)  1 tab PO DAILY [facility]


   Stop: 19 08:59


   Last Admin: 11/10/18 09:42 Dose:  1 tab


Quetiapine Fumarate (Seroquel)  25 mg PO BID [facility]; Protocol


   Stop: 19 08:59


   Last Admin: 11/10/18 09:42 Dose:  25 mg


Quetiapine Fumarate (Seroquel)  100 mg PO HS [facility]; Protocol


   Stop: 19 20:59


   Last Admin: 18 21:09 Dose:  100 mg


Zolpidem Tartrate (Ambien)  5 mg PO HS PRN


   PRN Reason: Insomnia


   Stop: 19 20:40


   Last Admin: 18 21:09 Dose:  5 mg








General: No acute distress


HEENT: Atraumatic, EOMI


Neck: Supple, JVD, Thyromegaly


Cardiovascular: Regular rate, Normal S1, Normal S2


Lungs: Clear to auscultation


Abdomen: Bowel sounds, Soft





Assessment/Plan





- Problem List


Patient Problems: 


All Active Problems





INCREASED AGITATION AND YELLING (Acute) 











- Assessment


Assessment: 





1.DM.


2.HTN.


3.HYPERLIPIDEMIA.


4.DEMENTIA.





- Plan


Plan: 





continue current treatment





Nutritional Asmnt/Malnutr-PDOC





- Dietary Evaluation


Malnutrition Findings (Please click <Entered> for more info): 








Nutritional Asmnt/Malnutrition                             Start:  18 12:

46


Text:                                                      Status: Complete    

  


Freq:                                                                          

  


Protocol:                                                                      

  


 Document     18 12:46  MARCO  (Rec: 18 12:55  MARCO [email or web address])


 Nutritional Asmnt/Malnutrition


     Patient General Information


      Nutritional Screening                      Moderate Risk


      Diagnosis                                  psychosis


      Pertinent Medical Hx/Surgical Hx           HTN, DM, hyperlipidemia,


                                                 dementia, bipolar disorder,


                                                 psychosis, PUD/GERD, ESRD


      Subjective Information                     Pt sleeping at time of visit.


                                                 Nursing noted PO intake: ~75%.


      Current Diet Order/ Nutrition Support      [facility] soft chopped, PAULIE, CCHO


      Pertinent Medications                      colace, heparin, novolog,


                                                 metformin, remeron, theragran,


                                                 seroquel


      Pertinent Labs                             : -126


                                                 : -129


                                                  glucose 118


     Nutritional Hx/Data


      Height                                     1.7 m


      Height (Calculated Centimeters)            170.2


      Current Weight (lbs)                       77.111 kg


      Weight (Calculated Kilograms)              77.1


      Weight (Calculated Grams)                  79642.7


      Ideal Body Weight                          135 lb


      Body Mass Index (BMI)                      26.6


      Weight Status                              Overweight


     GI Symptoms


      GI Symptoms                                None


      Last BM                                    


      Difficult in:                              None


      Food Allergies                             No


      Skin Integrity/Comment:                    intact, glenn 17


      Current %PO                                Good (%)


     Estimated Nutritional Goals


      BEE in Kcals:                              Using Current wt


      Calories/Kcals/Kg                          23-27


      Kcals Calculated                           2232-7918


      Protein:                                   Using Current wt


      Protein g/k.8-1


      Protein Calculated                         62-77 g


      Fluid: ml                                  0669-4475 (1 ml/kcal)


     Nutritional Problem


      1. Problem


       Problem                                   altered nutrition related lab


                                                 values


       Etiology                                  hyperglycemia, endocrine


                                                 dysfunction


       Signs/Symptoms:                           -126


     Malnutrition Alert


      Is there a minimum of two criteria         No


       selected?                                 


       Query Text:Check all the applicable       


       criteria. A minimum of two criteria are   


       recommended for diagnosis of either       


       severe or non-severe malnutrition.        


     Malnutrition Related to Morbid Obesity


      Malnutrition related to morbid obesity     No


     Intervention/Recommendation


      Comments                                   1. Continue with [facility] soft


                                                 chopped, PAULIE, CCHO diet as


                                                 ordered


                                                 2. Monitor PO intake, wt, labs


                                                 and skin integrity


                                                 3. F/U as moderate risk in 3-5


                                                 days, -


     Expected Outcomes/Goals


      Expected Outcomes/Goals                    1. PO intake to meet at least


                                                 75% of all meals


                                                 2. Wt stability, skin to


                                                 remain intact, and nutrition


                                                 related labs to approach


                                                 normal limits


                                                 Reviewed by Annette Whyte RD

## 2018-11-11 RX ADMIN — INSULIN ASPART SCH: 100 INJECTION, SOLUTION INTRAVENOUS; SUBCUTANEOUS at 17:26

## 2018-11-11 RX ADMIN — INSULIN ASPART SCH: 100 INJECTION, SOLUTION INTRAVENOUS; SUBCUTANEOUS at 12:11

## 2018-11-11 RX ADMIN — INSULIN ASPART SCH: 100 INJECTION, SOLUTION INTRAVENOUS; SUBCUTANEOUS at 21:07

## 2018-11-11 RX ADMIN — INSULIN ASPART SCH: 100 INJECTION, SOLUTION INTRAVENOUS; SUBCUTANEOUS at 06:35

## 2018-11-11 NOTE — PROGRESS NOTES
DATE:  11/10/2018



SUBJECTIVE:  The patient was seen and evaluated.  The patient's chart reviewed. 



A 72-year-old female.  She was initially brought in here, presented with

psychotic, disorganized, confused and making nonsensical comments and needing a

lot of redirection today.



Medications were reviewed.  She is currently on ____, Colace, heparin, Ativan,

Remeron, Seroquel.  Nursing staff reporting no side effects of the medications. 

Today on face-to-face evaluation, the patient has been yelling, screaming,

responding heavily.  She had ____ medications yesterday of the Haldol and

Benadryl.  No redirection upon interview.



ASSESSMENT AND PLAN:  The patient is a 72-year-old female, severely

disorganized.  We will continue with the current medication regimen.   To

continue taking with the patient's ongoing severe psychotic symptoms.  We will

be obtaining more collateral baseline information.





DD: 11/10/2018 14:41

DT: 11/11/2018 09:46

JOB# [id number]  [id number]

## 2018-11-11 NOTE — GENERAL PROGRESS NOTE
Subjective





- Review of Systems


Service Date: 18


Subjective: 





resting comfortably in bed


non participatory





Objective





- Results


Result Diagrams: 


 18 17:19





 18 17:19


Recent Labs: 


 Laboratory Last Values











WBC  12.3 Th/cmm (4.8-10.8)  H  18  17:19    


 


RBC  3.82 Mil/cmm (3.80-5.20)   18  17:19    


 


Hgb  12.1 gm/dL (12-16)   18  17:19    


 


Hct  36.8 % (41.0-60)  L  18  17:19    


 


MCV  96.4 fl ()   18  17:19    


 


MCH  31.7 pg (27.0-31.0)  H  18  17:19    


 


MCHC Differential  32.9 pg (28.0-36.0)   18  17:19    


 


RDW  14.3 % (11.5-20.0)   18  17:19    


 


Plt Count  375 Th/cmm (150-400)   18  17:19    


 


MPV  9.2 fl  18  17:19    


 


Neutrophils %  50.0 % (40.0-80.0)   18  17:19    


 


Lymphocytes %  39.5 % (20.0-50.0)   18  17:19    


 


Monocytes %  6.3 % (2.0-10.0)   18  17:19    


 


Eosinophils %  3.2 % (0.0-5.0)   18  17:19    


 


Basophils %  1.0 % (0.0-2.0)   18  17:19    


 


Sodium  142 mEq/L (136-145)   18  17:19    


 


Potassium  3.6 mEq/L (3.5-5.1)   18  17:19    


 


Chloride  106 mEq/L ()   18  17:19    


 


Carbon Dioxide  26.2 mEq/L (21.0-31.0)   18  17:19    


 


Anion Gap  13.4  (7.0-16.0)   18  17:19    


 


BUN  25 mg/dL (7-25)   18  17:19    


 


Creatinine  1.0 mg/dL (0.6-1.2)   18  17:19    


 


Est GFR ( Amer)  TNP   18  17:19    


 


Est GFR (Non-Af Amer)  TNP   18  17:19    


 


BUN/Creatinine Ratio  25.0   18  17:19    


 


Glucose  118 mg/dL ()  H  18  17:19    


 


POC Glucose  130 MG/DL (70 - 105)  H  18  12:07    


 


Calcium  9.9 mg/dL (8.6-10.3)   18  17:19    


 


Total Bilirubin  0.3 mg/dL (0.3-1.0)   18  17:19    


 


AST  15 U/L (13-39)   18  17:19    


 


ALT  13 U/L (7-52)   18  17:19    


 


Alkaline Phosphatase  72 U/L ()   18  17:19    


 


Troponin I  0.01 ng/mL (0.01-0.05)   18  17:19    


 


Total Protein  7.6 gm/dL (6.0-8.3)   18  17:19    


 


Albumin  4.0 gm/dL (3.7-5.3)   18  17:19    


 


Globulin  3.6 gm/dL  18  17:19    


 


Albumin/Globulin Ratio  1.1  (1.0-1.8)   18  17:19    


 


Triglycerides  189 mg/dL (<150)  H  18  06:05    


 


Cholesterol  200 mg/dL (<200)   18  06:05    


 


LDL Cholesterol Direct  143 mg/dL ()   18  06:05    


 


HDL Cholesterol  40 mg/dL (23-92)   18  06:05    


 


TSH  0.48 uIU/ml (0.34-5.60)   18  17:19    


 


Salicylates  < 25.0 mg/L (30.0-100.0)  L  18  17:19    


 


Acetaminophen  < 10.0 ug/mL (10.0-30.0)  L  18  17:19    


 


Ethyl Alcohol  < 10 mg/dL (0-10)   18  17:19    


 


RPR  NONREACTIVE  (NONREACTIVE)   18  17:19    














- Physical Exam


Vitals and I&O: 


 Vital Signs











Temp  97.9 F   18 14:00


 


Pulse  105   18 14:00


 


Resp  20   18 14:00


 


BP  112/56   18 14:00


 


Pulse Ox  95   18 14:00








 Intake & Output











 11/10/18 11/11/18 11/11/18





 18:59 06:59 18:59


 


Intake Total  240 


 


Output Total  1 


 


Balance  239 


 


Intake:   


 


  Oral  240 


 


Output:   


 


  Urine/Stool Mix  1 


 


Other:   


 


  # Voids  1 


 


  Stool Characteristics Soft  Soft











Active Medications: 


Current Medications





Acetaminophen (Tylenol)  650 mg PO Q4HR PRN


   PRN Reason: Mild Pain / Temp above 100


   Stop: 19 20:40


   Last Admin: 11/10/18 13:25 Dose:  650 mg


Al Hydrox/Mg Hydrox/Simethicone (Maalox)  30 ml PO Q4HR PRN


   PRN Reason: GI DISTRESS


   Stop: 19 20:40


Amlodipine Besylate (Norvasc)  10 mg PO DAILY [facility]


   Stop: 19 08:59


   Last Admin: 18 09:08 Dose:  10 mg


Calamine/Phenol (Calmoseptine)  1 appl TP PRN PRN


   PRN Reason: IAD 


   Stop: 19 05:59


   Last Admin: 11/10/18 21:02 Dose:  1 appl


Docusate Sodium (Colace)  100 mg PO BID [facility]


   Stop: 19 08:59


   Last Admin: 18 16:23 Dose:  100 mg


Heparin Sodium (Porcine) (Heparin)  5,000 units SUBQ Q12HR [facility]


   Stop: 19 21:59


   Last Admin: 18 08:53 Dose:  5,000 units


Insulin Aspart (Novolog Insulin Sliding Scale)  0 units SUBQ ACHS [facility]; Protocol


   Stop: 19 20:59


   Last Admin: 18 12:11 Dose:  Not Given


Lorazepam (Ativan)  0.5 mg PO Q4H PRN; Protocol


   PRN Reason: Anxiety


   Stop: 19 20:34


   Last Admin: 18 14:09 Dose:  0.5 mg


Magnesium Hydroxide (Milk Of Magnesia)  30 ml PO Q4H PRN


   PRN Reason: Constipation


   Stop: 19 20:34


Metformin HCl (Glucophage)  500 mg PO BID [facility]


   Stop: 19 08:59


   Last Admin: 18 16:23 Dose:  500 mg


Mirtazapine (Remeron)  7.5 mg PO HS [facility]; Protocol


   Stop: 19 20:59


   Last Admin: 11/10/18 21:00 Dose:  7.5 mg


Multivitamins/Vitamin C (Theragran)  1 tab PO DAILY [facility]


   Stop: 19 08:59


   Last Admin: 18 08:53 Dose:  1 tab


Quetiapine Fumarate (Seroquel)  25 mg PO BID [facility]; Protocol


   Stop: 19 08:59


   Last Admin: 18 16:23 Dose:  25 mg


Quetiapine Fumarate (Seroquel)  100 mg PO HS [facility]; Protocol


   Stop: 19 20:59


   Last Admin: 11/10/18 21:00 Dose:  100 mg


Zolpidem Tartrate (Ambien)  5 mg PO HS PRN


   PRN Reason: Insomnia


   Stop: 19 20:40


   Last Admin: 11/10/18 23:16 Dose:  5 mg








General: No acute distress


HEENT: Atraumatic, EOMI


Neck: Supple, JVD, Thyromegaly


Cardiovascular: Regular rate, Normal S1, Normal S2


Lungs: Clear to auscultation


Abdomen: Bowel sounds, Soft





Assessment/Plan





- Problem List


Patient Problems: 


All Active Problems





INCREASED AGITATION AND YELLING (Acute) 











- Assessment


Assessment: 





1.DM.


2.HTN.


3.HYPERLIPIDEMIA.


4.DEMENTIA.





- Plan


Plan: 





continue current treatment





Nutritional Asmnt/Malnutr-PDOC





- Dietary Evaluation


Malnutrition Findings (Please click <Entered> for more info): 








Nutritional Asmnt/Malnutrition                             Start:  18 12:

46


Text:                                                      Status: Complete    

  


Freq:                                                                          

  


Protocol:                                                                      

  


 Document     18 12:46  MARCO  (Rec: 18 12:55  MARCO [email or web address])


 Nutritional Asmnt/Malnutrition


     Patient General Information


      Nutritional Screening                      Moderate Risk


      Diagnosis                                  psychosis


      Pertinent Medical Hx/Surgical Hx           HTN, DM, hyperlipidemia,


                                                 dementia, bipolar disorder,


                                                 psychosis, PUD/GERD, ESRD


      Subjective Information                     Pt sleeping at time of visit.


                                                 Nursing noted PO intake: ~75%.


      Current Diet Order/ Nutrition Support      [facility] soft chopped, PAULIE, CCHO


      Pertinent Medications                      colace, heparin, novolog,


                                                 metformin, remeron, theragran,


                                                 seroquel


      Pertinent Labs                             : -126


                                                 : -129


                                                  glucose 118


     Nutritional Hx/Data


      Height                                     1.7 m


      Height (Calculated Centimeters)            170.2


      Current Weight (lbs)                       77.111 kg


      Weight (Calculated Kilograms)              77.1


      Weight (Calculated Grams)                  73168.7


      Ideal Body Weight                          135 lb


      Body Mass Index (BMI)                      26.6


      Weight Status                              Overweight


     GI Symptoms


      GI Symptoms                                None


      Last BM                                    


      Difficult in:                              None


      Food Allergies                             No


      Skin Integrity/Comment:                    glenn hope 17


      Current %PO                                Good (%)


     Estimated Nutritional Goals


      BEE in Kcals:                              Using Current wt


      Calories/Kcals/Kg                          23-27


      Kcals Calculated                           5981-7530


      Protein:                                   Using Current wt


      Protein g/k.8-1


      Protein Calculated                         62-77 g


      Fluid: ml                                  9274-8555 (1 ml/kcal)


     Nutritional Problem


      1. Problem


       Problem                                   altered nutrition related lab


                                                 values


       Etiology                                  hyperglycemia, endocrine


                                                 dysfunction


       Signs/Symptoms:                           -126


     Malnutrition Alert


      Is there a minimum of two criteria         No


       selected?                                 


       Query Text:Check all the applicable       


       criteria. A minimum of two criteria are   


       recommended for diagnosis of either       


       severe or non-severe malnutrition.        


     Malnutrition Related to Morbid Obesity


      Malnutrition related to morbid obesity     No


     Intervention/Recommendation


      Comments                                   1. Continue with [facility] soft


                                                 chopped, PAULIE, CCHO diet as


                                                 ordered


                                                 2. Monitor PO intake, wt, labs


                                                 and skin integrity


                                                 3. F/U as moderate risk in 3-5


                                                 days, -14


     Expected Outcomes/Goals


      Expected Outcomes/Goals                    1. PO intake to meet at least


                                                 75% of all meals


                                                 2. Wt stability, skin to


                                                 remain intact, and nutrition


                                                 related labs to approach


                                                 normal limits


                                                 Reviewed by Annette Whyte RD

## 2018-11-12 RX ADMIN — INSULIN ASPART SCH: 100 INJECTION, SOLUTION INTRAVENOUS; SUBCUTANEOUS at 11:24

## 2018-11-12 RX ADMIN — INSULIN ASPART SCH: 100 INJECTION, SOLUTION INTRAVENOUS; SUBCUTANEOUS at 20:20

## 2018-11-12 RX ADMIN — INSULIN ASPART SCH: 100 INJECTION, SOLUTION INTRAVENOUS; SUBCUTANEOUS at 17:01

## 2018-11-12 RX ADMIN — INSULIN ASPART SCH: 100 INJECTION, SOLUTION INTRAVENOUS; SUBCUTANEOUS at 06:31

## 2018-11-12 NOTE — PROGRESS NOTES
DATE:  11/12/2018



SUBJECTIVE:  Case was discussed with staff of the patient, reviewed records. 

The patient continues to be confused.  She continues to have episodes of yelling

and screaming.  She is in a room by herself in isolation.  She continues to have

poor insight.  She continues to be unable to participate in meaningful

conversation.  No side effects of the medication, no sedation, no nausea, and no

extrapyramidal symptoms.  I will be increasing her Seroquel dose from 25 to 50

mg twice a day and so far no side effects of the medication, no sedation, no

nausea, and no extrapyramidal symptoms.  We will continue to work with the

patient in group therapy, milieu therapy, and adjust the medications as needed.





DD: 11/12/2018 13:10

DT: 11/12/2018 23:32

[facility]# [id number]  [id number]

## 2018-11-12 NOTE — PROGRESS NOTES
DATE:  11/11/2018



SUBJECTIVE:  The patient was seen and evaluated.  The patient's chart reviewed.



Covering for Dr. Preston.  



Today on face-to-face, the patient continues to respond heavily on voices, just

screaming, yelling and disengaged in the interview.



MENTAL STATUS EXAMINATION:  Responding heavily on voices, disengaged.



ASSESSMENT AND PLAN:  We will continue with current primary psychiatrist

treatment plan and goals.  Medication recently adjusted to continue to target

the patient's auditory hallucinations.  We will continue monitoring and

evaluating.





DD: 11/11/2018 06:36

DT: 11/12/2018 04:18

JOB# [id number]  [id number]

## 2018-11-12 NOTE — INTERNAL MEDICINE PROG NOTE
Internal Medicine Subjective





- Subjective


Service Date: 18


Patient seen and examined:: without staff


Patient is:: awake, verbal, in bed, talking, confused


Per staff patient has:: no adverse event





Internal Medicine Objective





- Results


Result Diagrams: 


 18 17:19





 18 17:19


Recent Labs: 


 Laboratory Last Values











WBC  12.3 Th/cmm (4.8-10.8)  H  18  17:19    


 


RBC  3.82 Mil/cmm (3.80-5.20)   18  17:19    


 


Hgb  12.1 gm/dL (12-16)   18  17:19    


 


Hct  36.8 % (41.0-60)  L  18  17:19    


 


MCV  96.4 fl ()   18  17:19    


 


MCH  31.7 pg (27.0-31.0)  H  18  17:19    


 


MCHC Differential  32.9 pg (28.0-36.0)   18  17:19    


 


RDW  14.3 % (11.5-20.0)   18  17:19    


 


Plt Count  375 Th/cmm (150-400)   18  17:19    


 


MPV  9.2 fl  18  17:19    


 


Neutrophils %  50.0 % (40.0-80.0)   18  17:19    


 


Lymphocytes %  39.5 % (20.0-50.0)   18  17:19    


 


Monocytes %  6.3 % (2.0-10.0)   18  17:19    


 


Eosinophils %  3.2 % (0.0-5.0)   18  17:19    


 


Basophils %  1.0 % (0.0-2.0)   18  17:19    


 


Sodium  142 mEq/L (136-145)   18  17:19    


 


Potassium  3.6 mEq/L (3.5-5.1)   18  17:19    


 


Chloride  106 mEq/L ()   18  17:19    


 


Carbon Dioxide  26.2 mEq/L (21.0-31.0)   18  17:19    


 


Anion Gap  13.4  (7.0-16.0)   18  17:19    


 


BUN  25 mg/dL (7-25)   18  17:19    


 


Creatinine  1.0 mg/dL (0.6-1.2)   18  17:19    


 


Est GFR ( Amer)  TNP   18  17:19    


 


Est GFR (Non-Af Amer)  TNP   18  17:19    


 


BUN/Creatinine Ratio  25.0   18  17:19    


 


Glucose  118 mg/dL ()  H  18  17:19    


 


POC Glucose  111 MG/DL (70 - 105)  H  18  06:26    


 


Calcium  9.9 mg/dL (8.6-10.3)   18  17:19    


 


Total Bilirubin  0.3 mg/dL (0.3-1.0)   18  17:19    


 


AST  15 U/L (13-39)   18  17:19    


 


ALT  13 U/L (7-52)   18  17:19    


 


Alkaline Phosphatase  72 U/L ()   18  17:19    


 


Troponin I  0.01 ng/mL (0.01-0.05)   18  17:19    


 


Total Protein  7.6 gm/dL (6.0-8.3)   18  17:19    


 


Albumin  4.0 gm/dL (3.7-5.3)   18  17:19    


 


Globulin  3.6 gm/dL  18  17:19    


 


Albumin/Globulin Ratio  1.1  (1.0-1.8)   18  17:19    


 


Triglycerides  189 mg/dL (<150)  H  18  06:05    


 


Cholesterol  200 mg/dL (<200)   18  06:05    


 


LDL Cholesterol Direct  143 mg/dL ()   18  06:05    


 


HDL Cholesterol  40 mg/dL (23-92)   18  06:05    


 


TSH  0.48 uIU/ml (0.34-5.60)   18  17:19    


 


Salicylates  < 25.0 mg/L (30.0-100.0)  L  18  17:19    


 


Acetaminophen  < 10.0 ug/mL (10.0-30.0)  L  18  17:19    


 


Ethyl Alcohol  < 10 mg/dL (0-10)   18  17:19    


 


RPR  NONREACTIVE  (NONREACTIVE)   18  17:19    














- Physical Exam


Vitals and I&O: 


 Vital Signs











Temp  98.1 F   18 20:00


 


Pulse  88   18 20:00


 


Resp  19   18 20:00


 


BP  130/60   18 20:00


 


Pulse Ox  98   18 20:00








 Intake & Output











 18





 06:59 18:59 06:59


 


Intake Total 240 800 120


 


Balance 240 800 120


 


Intake:   


 


  Oral 240 800 120


 


Other:   


 


  # Voids 3  2


 


  # Bowel Movements 0  0











Active Medications: 


Current Medications





Acetaminophen (Tylenol)  650 mg PO Q4HR PRN


   PRN Reason: Mild Pain / Temp above 100


   Stop: 19 20:40


   Last Admin: 18 17:00 Dose:  650 mg


Al Hydrox/Mg Hydrox/Simethicone (Maalox)  30 ml PO Q4HR PRN


   PRN Reason: GI DISTRESS


   Stop: 19 20:40


Amlodipine Besylate (Norvasc)  10 mg PO DAILY [facility]


   Stop: 19 08:59


   Last Admin: 18 08:48 Dose:  10 mg


Calamine/Phenol (Calmoseptine)  1 appl TP PRN PRN


   PRN Reason: IAD 


   Stop: 19 05:59


   Last Admin: 11/10/18 21:02 Dose:  1 appl


Docusate Sodium (Colace)  100 mg PO BID [facility]


   Stop: 19 08:59


   Last Admin: 18 17:00 Dose:  100 mg


Heparin Sodium (Porcine) (Heparin)  5,000 units SUBQ Q12HR [facility]


   Stop: 19 21:59


   Last Admin: 18 20:20 Dose:  5,000 units


Insulin Aspart (Novolog Insulin Sliding Scale)  0 units SUBQ ACHS [facility]; Protocol


   Stop: 19 20:59


   Last Admin: 18 20:20 Dose:  Not Given


Lorazepam (Ativan)  0.5 mg PO Q4H PRN; Protocol


   PRN Reason: Anxiety


   Stop: 19 20:34


   Last Admin: 18 16:59 Dose:  0.5 mg


Magnesium Hydroxide (Milk Of Magnesia)  30 ml PO Q4H PRN


   PRN Reason: Constipation


   Stop: 19 20:34


Metformin HCl (Glucophage)  500 mg PO BID [facility]


   Stop: 19 08:59


   Last Admin: 18 17:00 Dose:  500 mg


Mirtazapine (Remeron)  7.5 mg PO HS [facility]; Protocol


   Stop: 19 20:59


   Last Admin: 18 20:19 Dose:  7.5 mg


Multivitamins/Vitamin C (Theragran)  1 tab PO DAILY [facility]


   Stop: 19 08:59


   Last Admin: 18 08:50 Dose:  1 tab


Quetiapine Fumarate (Seroquel)  100 mg PO HS [facility]; Protocol


   Stop: 19 20:59


   Last Admin: 18 20:20 Dose:  100 mg


Quetiapine Fumarate (Seroquel)  50 mg PO BID [facility]; Protocol


   Stop: 19 16:59


   Last Admin: 18 17:00 Dose:  50 mg


Zolpidem Tartrate (Ambien)  5 mg PO HS PRN


   PRN Reason: Insomnia


   Stop: 19 20:40


   Last Admin: 18 20:20 Dose:  5 mg








General: demented


HEENT: NC/AT, PERRLA, EOMI, anicteric sclerae, throat clear


Neck: Supple, No JVD, No thyromegaly, +2 carotid pulse wo bruit, No LAD, + JVD


Cardiovascular: Normal S1, Normal S2, without murmur


Abdomen: soft, non-tender, non-distended


Extremities: clear


Neurological: no change





Internal Medicine Assmt/Plan





- Assessment


Assessment: 





1.DM.


2.HTN.


3.HYPERLIPIDEMIA.


4.DEMENTIA.





- Plan


Plan: 





CONTINUE ON CURRENT MEDICATION AND DIET.





Nutritional Asmnt/Malnutr-PDOC





- Dietary Evaluation


Malnutrition Findings (Please click <Entered> for more info): 








Nutritional Asmnt/Malnutrition                             Start:  18 12:

46


Text:                                                      Status: Complete    

  


Freq:                                                                          

  


Protocol:                                                                      

  


 Document     18 12:46  MARCO  (Rec: 18 12:55  MARCO [email or web address])


 Nutritional Asmnt/Malnutrition


     Patient General Information


      Nutritional Screening                      Moderate Risk


      Diagnosis                                  psychosis


      Pertinent Medical Hx/Surgical Hx           HTN, DM, hyperlipidemia,


                                                 dementia, bipolar disorder,


                                                 psychosis, PUD/GERD, ESRD


      Subjective Information                     Pt sleeping at time of visit.


                                                 Nursing noted PO intake: ~75%.


      Current Diet Order/ Nutrition Support      [facility] soft chopped, PAULIE, CCHO


      Pertinent Medications                      colace, heparin, novolog,


                                                 metformin, remeron, theragran,


                                                 seroquel


      Pertinent Labs                             : -126


                                                 : -129


                                                  glucose 118


     Nutritional Hx/Data


      Height                                     1.7 m


      Height (Calculated Centimeters)            170.2


      Current Weight (lbs)                       77.111 kg


      Weight (Calculated Kilograms)              77.1


      Weight (Calculated Grams)                  61292.7


      Ideal Body Weight                          135 lb


      Body Mass Index (BMI)                      26.6


      Weight Status                              Overweight


     GI Symptoms


      GI Symptoms                                None


      Last BM                                    


      Difficult in:                              None


      Food Allergies                             No


      Skin Integrity/Comment:                    glenn hope 17


      Current %PO                                Good (%)


     Estimated Nutritional Goals


      BEE in Kcals:                              Using Current wt


      Calories/Kcals/Kg                          23-27


      Kcals Calculated                           5499-3657


      Protein:                                   Using Current wt


      Protein g/k.8-1


      Protein Calculated                         62-77 g


      Fluid: ml                                  0776-2702 (1 ml/kcal)


     Nutritional Problem


      1. Problem


       Problem                                   altered nutrition related lab


                                                 values


       Etiology                                  hyperglycemia, endocrine


                                                 dysfunction


       Signs/Symptoms:                           -126


     Malnutrition Alert


      Is there a minimum of two criteria         No


       selected?                                 


       Query Text:Check all the applicable       


       criteria. A minimum of two criteria are   


       recommended for diagnosis of either       


       severe or non-severe malnutrition.        


     Malnutrition Related to Morbid Obesity


      Malnutrition related to morbid obesity     No


     Intervention/Recommendation


      Comments                                   1. Continue with [facility] soft


                                                 chopped, PAULIE, CCHO diet as


                                                 ordered


                                                 2. Monitor PO intake, wt, labs


                                                 and skin integrity


                                                 3. F/U as moderate risk in 3-5


                                                 days, -14


     Expected Outcomes/Goals


      Expected Outcomes/Goals                    1. PO intake to meet at least


                                                 75% of all meals


                                                 2. Wt stability, skin to


                                                 remain intact, and nutrition


                                                 related labs to approach


                                                 normal limits


                                                 Reviewed by Annette Whyte RD

## 2018-11-13 RX ADMIN — INSULIN ASPART SCH: 100 INJECTION, SOLUTION INTRAVENOUS; SUBCUTANEOUS at 20:46

## 2018-11-13 RX ADMIN — INSULIN ASPART SCH: 100 INJECTION, SOLUTION INTRAVENOUS; SUBCUTANEOUS at 11:58

## 2018-11-13 RX ADMIN — INSULIN ASPART SCH: 100 INJECTION, SOLUTION INTRAVENOUS; SUBCUTANEOUS at 06:37

## 2018-11-13 RX ADMIN — INSULIN ASPART SCH: 100 INJECTION, SOLUTION INTRAVENOUS; SUBCUTANEOUS at 17:30

## 2018-11-13 NOTE — PROGRESS NOTES
DATE:  11/13/2018



Case was discussed with staff of the patient, reviewed records.  The patient

continues to be yelling and screaming.  Continues to be unpredictable,

impulsive, needing redirection.  Sleeping has to be prompted to do her ADLs,

need help with her ADLs demented, confused, unable to make safe plan for

self-care.  I did increase her Seroquel dose yesterday with no side effects, no

sedation, no nausea, no extrapyramidal symptoms.  I will make further

adjustments to her Seroquel dose and we will continue to work with the patient

in group therapy, milieu therapy, adjust medication as needed.





DD: 11/13/2018 11:35

DT: 11/13/2018 15:54

JOB# [id number]  [id number]

## 2018-11-13 NOTE — INTERNAL MEDICINE PROG NOTE
Internal Medicine Subjective





- Subjective


Service Date: 18


Patient seen and examined:: without staff


Patient is:: awake, verbal, in bed, talking, confused


Per staff patient has:: no adverse event





Internal Medicine Objective





- Results


Result Diagrams: 


 18 17:19





 18 17:19


Recent Labs: 


 Laboratory Last Values











WBC  12.3 Th/cmm (4.8-10.8)  H  18  17:19    


 


RBC  3.82 Mil/cmm (3.80-5.20)   18  17:19    


 


Hgb  12.1 gm/dL (12-16)   18  17:19    


 


Hct  36.8 % (41.0-60)  L  18  17:19    


 


MCV  96.4 fl ()   18  17:19    


 


MCH  31.7 pg (27.0-31.0)  H  18  17:19    


 


MCHC Differential  32.9 pg (28.0-36.0)   18  17:19    


 


RDW  14.3 % (11.5-20.0)   18  17:19    


 


Plt Count  375 Th/cmm (150-400)   18  17:19    


 


MPV  9.2 fl  18  17:19    


 


Neutrophils %  50.0 % (40.0-80.0)   18  17:19    


 


Lymphocytes %  39.5 % (20.0-50.0)   18  17:19    


 


Monocytes %  6.3 % (2.0-10.0)   18  17:19    


 


Eosinophils %  3.2 % (0.0-5.0)   18  17:19    


 


Basophils %  1.0 % (0.0-2.0)   18  17:19    


 


Sodium  142 mEq/L (136-145)   18  17:19    


 


Potassium  3.6 mEq/L (3.5-5.1)   18  17:19    


 


Chloride  106 mEq/L ()   18  17:19    


 


Carbon Dioxide  26.2 mEq/L (21.0-31.0)   18  17:19    


 


Anion Gap  13.4  (7.0-16.0)   18  17:19    


 


BUN  25 mg/dL (7-25)   18  17:19    


 


Creatinine  1.0 mg/dL (0.6-1.2)   18  17:19    


 


Est GFR ( Amer)  TNP   18  17:19    


 


Est GFR (Non-Af Amer)  TNP   18  17:19    


 


BUN/Creatinine Ratio  25.0   18  17:19    


 


Glucose  118 mg/dL ()  H  18  17:19    


 


POC Glucose  130 MG/DL (70 - 105)  H  18  11:54    


 


Calcium  9.9 mg/dL (8.6-10.3)   18  17:19    


 


Total Bilirubin  0.3 mg/dL (0.3-1.0)   18  17:19    


 


AST  15 U/L (13-39)   18  17:19    


 


ALT  13 U/L (7-52)   18  17:19    


 


Alkaline Phosphatase  72 U/L ()   18  17:19    


 


Troponin I  0.01 ng/mL (0.01-0.05)   18  17:19    


 


Total Protein  7.6 gm/dL (6.0-8.3)   18  17:19    


 


Albumin  4.0 gm/dL (3.7-5.3)   18  17:19    


 


Globulin  3.6 gm/dL  18  17:19    


 


Albumin/Globulin Ratio  1.1  (1.0-1.8)   18  17:19    


 


Triglycerides  189 mg/dL (<150)  H  18  06:05    


 


Cholesterol  200 mg/dL (<200)   18  06:05    


 


LDL Cholesterol Direct  143 mg/dL ()   18  06:05    


 


HDL Cholesterol  40 mg/dL (23-92)   18  06:05    


 


TSH  0.48 uIU/ml (0.34-5.60)   18  17:19    


 


Salicylates  < 25.0 mg/L (30.0-100.0)  L  18  17:19    


 


Acetaminophen  < 10.0 ug/mL (10.0-30.0)  L  18  17:19    


 


Ethyl Alcohol  < 10 mg/dL (0-10)   18  17:19    


 


RPR  NONREACTIVE  (NONREACTIVE)   18  17:19    














- Physical Exam


Vitals and I&O: 


 Vital Signs











Temp  98.1 F   18 20:02


 


Pulse  73   18 20:02


 


Resp  18   18 20:02


 


BP  129/65   18 20:02


 


Pulse Ox  92   18 20:02








 Intake & Output











 18





 06:59 18:59 06:59


 


Intake Total 120  240


 


Balance 120  240


 


Intake:   


 


  Oral 120  240


 


Other:   


 


  # Voids 2 3 2


 


  # Bowel Movements 0 1 











Active Medications: 


Current Medications





Acetaminophen (Tylenol)  650 mg PO Q4HR PRN


   PRN Reason: Mild Pain / Temp above 100


   Stop: 19 20:40


   Last Admin: 18 17:00 Dose:  650 mg


Al Hydrox/Mg Hydrox/Simethicone (Maalox)  30 ml PO Q4HR PRN


   PRN Reason: GI DISTRESS


   Stop: 19 20:40


Amlodipine Besylate (Norvasc)  10 mg PO DAILY [facility]


   Stop: 19 08:59


   Last Admin: 18 09:59 Dose:  Not Given


Calamine/Phenol (Calmoseptine)  1 appl TP PRN PRN


   PRN Reason: IAD 


   Stop: 19 05:59


   Last Admin: 11/10/18 21:02 Dose:  1 appl


Docusate Sodium (Colace)  100 mg PO BID [facility]


   Stop: 19 08:59


   Last Admin: 18 17:30 Dose:  Not Given


Heparin Sodium (Porcine) (Heparin)  5,000 units SUBQ Q12HR [facility]


   Stop: 19 21:59


   Last Admin: 18 09:59 Dose:  Not Given


Insulin Aspart (Novolog Insulin Sliding Scale)  0 units SUBQ ACHS [facility]; Protocol


   Stop: 19 20:59


   Last Admin: 18 17:30 Dose:  Not Given


Lorazepam (Ativan)  0.5 mg PO Q4H PRN; Protocol


   PRN Reason: Anxiety


   Stop: 19 20:34


   Last Admin: 18 11:50 Dose:  0.5 mg


Magnesium Hydroxide (Milk Of Magnesia)  30 ml PO Q4H PRN


   PRN Reason: Constipation


   Stop: 19 20:34


Metformin HCl (Glucophage)  500 mg PO BID [facility]


   Stop: 19 08:59


   Last Admin: 18 17:31 Dose:  500 mg


Mirtazapine (Remeron)  7.5 mg PO HS [facility]; Protocol


   Stop: 19 20:59


   Last Admin: 18 20:19 Dose:  7.5 mg


Multivitamins/Vitamin C (Theragran)  1 tab PO DAILY [facility]


   Stop: 19 08:59


   Last Admin: 18 10:00 Dose:  Not Given


Quetiapine Fumarate (Seroquel)  100 mg PO HS [facility]; Protocol


   Stop: 19 20:59


   Last Admin: 18 20:20 Dose:  100 mg


Quetiapine Fumarate 50 mg/ (Quetiapine Fumarate 25 mg)  75 mg PO BID [facility]


   Stop: 19 16:59


   Last Admin: 18 17:31 Dose:  75 mg


Zolpidem Tartrate (Ambien)  5 mg PO HS PRN


   PRN Reason: Insomnia


   Stop: 19 20:40


   Last Admin: 18 20:20 Dose:  5 mg








General: demented


HEENT: NC/AT, PERRLA, EOMI, anicteric sclerae, throat clear


Neck: Supple, No JVD, No thyromegaly, +2 carotid pulse wo bruit, No LAD, + JVD


Cardiovascular: Normal S1, Normal S2, without murmur


Abdomen: soft, non-tender, non-distended


Extremities: clear


Neurological: no change





Internal Medicine Assmt/Plan





- Assessment


Assessment: 





1.DM.


2.HTN.


3.HYPERLIPIDEMIA.


4.DEMENTIA.





- Plan


Plan: 





CONTINUE ON CURRENT MEDICATION AND DIET.





Nutritional Asmnt/Malnutr-PDOC





- Dietary Evaluation


Malnutrition Findings (Please click <Entered> for more info): 








Nutritional Asmnt/Malnutrition                             Start:  18 12:

46


Text:                                                      Status: Complete    

  


Freq:                                                                          

  


Protocol:                                                                      

  


 Document     18 12:46  MARCO  (Rec: 18 12:55  MARCO [email or web address])


 Nutritional Asmnt/Malnutrition


     Patient General Information


      Nutritional Screening                      Moderate Risk


      Diagnosis                                  psychosis


      Pertinent Medical Hx/Surgical Hx           HTN, DM, hyperlipidemia,


                                                 dementia, bipolar disorder,


                                                 psychosis, PUD/GERD, ESRD


      Subjective Information                     Pt sleeping at time of visit.


                                                 Nursing noted PO intake: ~75%.


      Current Diet Order/ Nutrition Support      [facility] soft chopped, PAULIE, CCHO


      Pertinent Medications                      colace, heparin, novolog,


                                                 metformin, remeron, theragran,


                                                 seroquel


      Pertinent Labs                             : -126


                                                 : -129


                                                  glucose 118


     Nutritional Hx/Data


      Height                                     1.7 m


      Height (Calculated Centimeters)            170.2


      Current Weight (lbs)                       77.111 kg


      Weight (Calculated Kilograms)              77.1


      Weight (Calculated Grams)                  46181.7


      Ideal Body Weight                          135 lb


      Body Mass Index (BMI)                      26.6


      Weight Status                              Overweight


     GI Symptoms


      GI Symptoms                                None


      Last BM                                    


      Difficult in:                              None


      Food Allergies                             No


      Skin Integrity/Comment:                    glenn hope 17


      Current %PO                                Good (%)


     Estimated Nutritional Goals


      BEE in Kcals:                              Using Current wt


      Calories/Kcals/Kg                          23-27


      Kcals Calculated                           5152-0104


      Protein:                                   Using Current wt


      Protein g/k.8-1


      Protein Calculated                         62-77 g


      Fluid: ml                                  6162-1843 (1 ml/kcal)


     Nutritional Problem


      1. Problem


       Problem                                   altered nutrition related lab


                                                 values


       Etiology                                  hyperglycemia, endocrine


                                                 dysfunction


       Signs/Symptoms:                           -126


     Malnutrition Alert


      Is there a minimum of two criteria         No


       selected?                                 


       Query Text:Check all the applicable       


       criteria. A minimum of two criteria are   


       recommended for diagnosis of either       


       severe or non-severe malnutrition.        


     Malnutrition Related to Morbid Obesity


      Malnutrition related to morbid obesity     No


     Intervention/Recommendation


      Comments                                   1. Continue with [facility] soft


                                                 chopped, PAULIE, CCHO diet as


                                                 ordered


                                                 2. Monitor PO intake, wt, labs


                                                 and skin integrity


                                                 3. F/U as moderate risk in 3-5


                                                 days, -14


     Expected Outcomes/Goals


      Expected Outcomes/Goals                    1. PO intake to meet at least


                                                 75% of all meals


                                                 2. Wt stability, skin to


                                                 remain intact, and nutrition


                                                 related labs to approach


                                                 normal limits


                                                 Reviewed by Annette Whyte RD

## 2018-11-14 RX ADMIN — INSULIN ASPART SCH: 100 INJECTION, SOLUTION INTRAVENOUS; SUBCUTANEOUS at 06:38

## 2018-11-14 RX ADMIN — INSULIN ASPART SCH: 100 INJECTION, SOLUTION INTRAVENOUS; SUBCUTANEOUS at 20:49

## 2018-11-14 RX ADMIN — INSULIN ASPART SCH: 100 INJECTION, SOLUTION INTRAVENOUS; SUBCUTANEOUS at 18:52

## 2018-11-14 RX ADMIN — INSULIN ASPART SCH: 100 INJECTION, SOLUTION INTRAVENOUS; SUBCUTANEOUS at 12:09

## 2018-11-14 NOTE — PROGRESS NOTES
DATE:  11/14/2018



Case was discussed with staff of the patient, reviewed records.  The patient had

to be medicated yesterday.  She was very agitated.  She is demented, confused,

unable to make safe plan for self-care or participate in meaningful

conversation.  Today, she is not yelling and screaming so far.  So we will give

her time like today if she is not yelling and screaming today and she is not

agitated, may be then she could go back to the nursing home facility and no side

effects with the medication.  No sedation or nausea.  No extrapyramidal

symptoms.  We will continue the patient in the group therapy, milieu therapy,

adjust medications as needed.





DD: 11/14/2018 08:15

DT: 11/14/2018 15:07

[facility]# [id number]  [id number]

## 2018-11-14 NOTE — INTERNAL MEDICINE PROG NOTE
Internal Medicine Subjective





- Subjective


Service Date: 18


Patient seen and examined:: without staff


Patient is:: awake, verbal, in bed, talking, confused


Per staff patient has:: no adverse event





Internal Medicine Objective





- Results


Result Diagrams: 


 18 17:19





 18 17:19


Recent Labs: 


 Laboratory Last Values











WBC  12.3 Th/cmm (4.8-10.8)  H  18  17:19    


 


RBC  3.82 Mil/cmm (3.80-5.20)   18  17:19    


 


Hgb  12.1 gm/dL (12-16)   18  17:19    


 


Hct  36.8 % (41.0-60)  L  18  17:19    


 


MCV  96.4 fl ()   18  17:19    


 


MCH  31.7 pg (27.0-31.0)  H  18  17:19    


 


MCHC Differential  32.9 pg (28.0-36.0)   18  17:19    


 


RDW  14.3 % (11.5-20.0)   18  17:19    


 


Plt Count  375 Th/cmm (150-400)   18  17:19    


 


MPV  9.2 fl  18  17:19    


 


Neutrophils %  50.0 % (40.0-80.0)   18  17:19    


 


Lymphocytes %  39.5 % (20.0-50.0)   18  17:19    


 


Monocytes %  6.3 % (2.0-10.0)   18  17:19    


 


Eosinophils %  3.2 % (0.0-5.0)   18  17:19    


 


Basophils %  1.0 % (0.0-2.0)   18  17:19    


 


Sodium  142 mEq/L (136-145)   18  17:19    


 


Potassium  3.6 mEq/L (3.5-5.1)   18  17:19    


 


Chloride  106 mEq/L ()   18  17:19    


 


Carbon Dioxide  26.2 mEq/L (21.0-31.0)   18  17:19    


 


Anion Gap  13.4  (7.0-16.0)   18  17:19    


 


BUN  25 mg/dL (7-25)   18  17:19    


 


Creatinine  1.0 mg/dL (0.6-1.2)   18  17:19    


 


Est GFR ( Amer)  TNP   18  17:19    


 


Est GFR (Non-Af Amer)  TNP   18  17:19    


 


BUN/Creatinine Ratio  25.0   18  17:19    


 


Glucose  118 mg/dL ()  H  18  17:19    


 


POC Glucose  131 MG/DL (70 - 105)  H  18  12:02    


 


Calcium  9.9 mg/dL (8.6-10.3)   18  17:19    


 


Total Bilirubin  0.3 mg/dL (0.3-1.0)   18  17:19    


 


AST  15 U/L (13-39)   18  17:19    


 


ALT  13 U/L (7-52)   18  17:19    


 


Alkaline Phosphatase  72 U/L ()   18  17:19    


 


Troponin I  0.01 ng/mL (0.01-0.05)   18  17:19    


 


Total Protein  7.6 gm/dL (6.0-8.3)   18  17:19    


 


Albumin  4.0 gm/dL (3.7-5.3)   18  17:19    


 


Globulin  3.6 gm/dL  18  17:19    


 


Albumin/Globulin Ratio  1.1  (1.0-1.8)   18  17:19    


 


Triglycerides  189 mg/dL (<150)  H  18  06:05    


 


Cholesterol  200 mg/dL (<200)   18  06:05    


 


LDL Cholesterol Direct  143 mg/dL ()   18  06:05    


 


HDL Cholesterol  40 mg/dL (23-92)   18  06:05    


 


TSH  0.48 uIU/ml (0.34-5.60)   18  17:19    


 


Salicylates  < 25.0 mg/L (30.0-100.0)  L  18  17:19    


 


Acetaminophen  < 10.0 ug/mL (10.0-30.0)  L  18  17:19    


 


Ethyl Alcohol  < 10 mg/dL (0-10)   18  17:19    


 


RPR  NONREACTIVE  (NONREACTIVE)   18  17:19    














- Physical Exam


Vitals and I&O: 


 Vital Signs











Temp  97.9 F   18 14:00


 


Pulse  69   18 14:00


 


Resp  18   18 14:00


 


BP  136/70   18 14:00


 


Pulse Ox  93   18 14:00








 Intake & Output











 11/14/18 11/14/18 11/15/18





 06:59 18:59 06:59


 


Intake Total 480 1200 


 


Balance 480 1200 


 


Intake:   


 


  Oral 480 1200 


 


Other:   


 


  # Voids 2  


 


  # Bowel Movements  1 











Active Medications: 


Current Medications





Acetaminophen (Tylenol)  650 mg PO Q4HR PRN


   PRN Reason: Mild Pain / Temp above 100


   Stop: 19 20:40


   Last Admin: 18 17:00 Dose:  650 mg


Al Hydrox/Mg Hydrox/Simethicone (Maalox)  30 ml PO Q4HR PRN


   PRN Reason: GI DISTRESS


   Stop: 19 20:40


Amlodipine Besylate (Norvasc)  10 mg PO DAILY [facility]


   Stop: 19 08:59


   Last Admin: 18 10:12 Dose:  10 mg


Calamine/Phenol (Calmoseptine)  1 appl TP PRN PRN


   PRN Reason: IAD 


   Stop: 19 05:59


   Last Admin: 11/10/18 21:02 Dose:  1 appl


Docusate Sodium (Colace)  100 mg PO BID [facility]


   Stop: 19 08:59


   Last Admin: 18 17:14 Dose:  100 mg


Heparin Sodium (Porcine) (Heparin)  5,000 units SUBQ Q12HR [facility]


   Stop: 19 21:59


   Last Admin: 18 10:13 Dose:  5,000 units


Insulin Aspart (Novolog Insulin Sliding Scale)  0 units SUBQ ACHS [facility]; Protocol


   Stop: 19 20:59


   Last Admin: 18 18:52 Dose:  Not Given


Lorazepam (Ativan)  0.5 mg PO Q4H PRN; Protocol


   PRN Reason: Anxiety


   Stop: 19 20:34


   Last Admin: 18 14:00 Dose:  0.5 mg


Magnesium Hydroxide (Milk Of Magnesia)  30 ml PO Q4H PRN


   PRN Reason: Constipation


   Stop: 19 20:34


Metformin HCl (Glucophage)  500 mg PO BID [facility]


   Stop: 19 08:59


   Last Admin: 18 17:14 Dose:  500 mg


Mirtazapine (Remeron)  7.5 mg PO HS [facility]; Protocol


   Stop: 19 20:59


   Last Admin: 18 20:37 Dose:  7.5 mg


Multivitamins/Vitamin C (Theragran)  1 tab PO DAILY [facility]


   Stop: 19 08:59


   Last Admin: 18 10:11 Dose:  1 tab


Quetiapine Fumarate (Seroquel)  100 mg PO HS [facility]; Protocol


   Stop: 19 20:59


   Last Admin: 18 20:37 Dose:  100 mg


Quetiapine Fumarate 50 mg/ (Quetiapine Fumarate 25 mg)  75 mg PO BID [facility]


   Stop: 19 16:59


   Last Admin: 18 17:15 Dose:  75 mg


Zolpidem Tartrate (Ambien)  5 mg PO HS PRN


   PRN Reason: Insomnia


   Stop: 19 20:40


   Last Admin: 18 20:37 Dose:  5 mg








General: demented


HEENT: NC/AT, PERRLA, EOMI, anicteric sclerae, throat clear


Neck: Supple, No JVD, No thyromegaly, +2 carotid pulse wo bruit, No LAD, + JVD


Cardiovascular: Normal S1, Normal S2, without murmur


Abdomen: soft, non-tender, non-distended


Extremities: clear


Neurological: no change





Internal Medicine Assmt/Plan





- Assessment


Assessment: 





1.DM.


2.HTN.


3.HYPERLIPIDEMIA.


4.DEMENTIA.





- Plan


Plan: 





CONTINUE ON CURRENT MEDICATION AND DIET.





Nutritional Asmnt/Malnutr-PDOC





- Dietary Evaluation


Malnutrition Findings (Please click <Entered> for more info): 








Nutritional Asmnt/Malnutrition                             Start:  18 12:

46


Text:                                                      Status: Complete    

  


Freq:                                                                          

  


Protocol:                                                                      

  


 Document     18 12:46  MARCO  (Rec: 18 12:55  DY[facility]  [email or web address])


 Nutritional Asmnt/Malnutrition


     Patient General Information


      Nutritional Screening                      Moderate Risk


      Diagnosis                                  psychosis


      Pertinent Medical Hx/Surgical Hx           HTN, DM, hyperlipidemia,


                                                 dementia, bipolar disorder,


                                                 psychosis, PUD/GERD, ESRD


      Subjective Information                     Pt sleeping at time of visit.


                                                 Nursing noted PO intake: ~75%.


      Current Diet Order/ Nutrition Support      [facility] soft chopped, PAULIE, CCHO


      Pertinent Medications                      colace, heparin, novolog,


                                                 metformin, remeron, theragran,


                                                 seroquel


      Pertinent Labs                             : -126


                                                 : -129


                                                  glucose 118


     Nutritional Hx/Data


      Height                                     1.7 m


      Height (Calculated Centimeters)            170.2


      Current Weight (lbs)                       77.111 kg


      Weight (Calculated Kilograms)              77.1


      Weight (Calculated Grams)                  49381.7


      Ideal Body Weight                          135 lb


      Body Mass Index (BMI)                      26.6


      Weight Status                              Overweight


     GI Symptoms


      GI Symptoms                                None


      Last BM                                    


      Difficult in:                              None


      Food Allergies                             No


      Skin Integrity/Comment:                    herminia, glenn 17


      Current %PO                                Good (%)


     Estimated Nutritional Goals


      BEE in Kcals:                              Using Current wt


      Calories/Kcals/Kg                          23-27


      Kcals Calculated                           5504-8202


      Protein:                                   Using Current wt


      Protein g/k.8-1


      Protein Calculated                         62-77 g


      Fluid: ml                                  0201-0120 (1 ml/kcal)


     Nutritional Problem


      1. Problem


       Problem                                   altered nutrition related lab


                                                 values


       Etiology                                  hyperglycemia, endocrine


                                                 dysfunction


       Signs/Symptoms:                           -126


     Malnutrition Alert


      Is there a minimum of two criteria         No


       selected?                                 


       Query Text:Check all the applicable       


       criteria. A minimum of two criteria are   


       recommended for diagnosis of either       


       severe or non-severe malnutrition.        


     Malnutrition Related to Morbid Obesity


      Malnutrition related to morbid obesity     No


     Intervention/Recommendation


      Comments                                   1. Continue with [facility] soft


                                                 chopped, PAULIE, CCHO diet as


                                                 ordered


                                                 2. Monitor PO intake, wt, labs


                                                 and skin integrity


                                                 3. F/U as moderate risk in 3-5


                                                 days, -14


     Expected Outcomes/Goals


      Expected Outcomes/Goals                    1. PO intake to meet at least


                                                 75% of all meals


                                                 2. Wt stability, skin to


                                                 remain intact, and nutrition


                                                 related labs to approach


                                                 normal limits


                                                 Reviewed by Annette Whyte RD

## 2018-11-15 RX ADMIN — INSULIN ASPART SCH: 100 INJECTION, SOLUTION INTRAVENOUS; SUBCUTANEOUS at 16:48

## 2018-11-15 RX ADMIN — INSULIN ASPART SCH: 100 INJECTION, SOLUTION INTRAVENOUS; SUBCUTANEOUS at 06:33

## 2018-11-15 RX ADMIN — INSULIN ASPART SCH: 100 INJECTION, SOLUTION INTRAVENOUS; SUBCUTANEOUS at 11:57

## 2018-11-15 RX ADMIN — INSULIN ASPART SCH: 100 INJECTION, SOLUTION INTRAVENOUS; SUBCUTANEOUS at 21:19

## 2018-11-15 NOTE — INTERNAL MEDICINE PROG NOTE
Internal Medicine Subjective





- Subjective


Service Date: 11/15/18


Patient seen and examined:: with staff


Patient is:: awake, verbal, in bed, talking, confused


Per staff patient has:: no adverse event





Internal Medicine Objective





- Results


Result Diagrams: 


 18 17:19





 18 17:19


Recent Labs: 


 Laboratory Last Values











WBC  12.3 Th/cmm (4.8-10.8)  H  18  17:19    


 


RBC  3.82 Mil/cmm (3.80-5.20)   18  17:19    


 


Hgb  12.1 gm/dL (12-16)   18  17:19    


 


Hct  36.8 % (41.0-60)  L  18  17:19    


 


MCV  96.4 fl ()   18  17:19    


 


MCH  31.7 pg (27.0-31.0)  H  18  17:19    


 


MCHC Differential  32.9 pg (28.0-36.0)   18  17:19    


 


RDW  14.3 % (11.5-20.0)   18  17:19    


 


Plt Count  375 Th/cmm (150-400)   18  17:19    


 


MPV  9.2 fl  18  17:19    


 


Neutrophils %  50.0 % (40.0-80.0)   18  17:19    


 


Lymphocytes %  39.5 % (20.0-50.0)   18  17:19    


 


Monocytes %  6.3 % (2.0-10.0)   18  17:19    


 


Eosinophils %  3.2 % (0.0-5.0)   18  17:19    


 


Basophils %  1.0 % (0.0-2.0)   18  17:19    


 


Sodium  142 mEq/L (136-145)   18  17:19    


 


Potassium  3.6 mEq/L (3.5-5.1)   18  17:19    


 


Chloride  106 mEq/L ()   18  17:19    


 


Carbon Dioxide  26.2 mEq/L (21.0-31.0)   18  17:19    


 


Anion Gap  13.4  (7.0-16.0)   18  17:19    


 


BUN  25 mg/dL (7-25)   18  17:19    


 


Creatinine  1.0 mg/dL (0.6-1.2)   18  17:19    


 


Est GFR ( Amer)  TNP   18  17:19    


 


Est GFR (Non-Af Amer)  TNP   18  17:19    


 


BUN/Creatinine Ratio  25.0   18  17:19    


 


Glucose  118 mg/dL ()  H  18  17:19    


 


POC Glucose  144 MG/DL (70 - 105)  H  11/15/18  19:53    


 


Calcium  9.9 mg/dL (8.6-10.3)   18  17:19    


 


Total Bilirubin  0.3 mg/dL (0.3-1.0)   18  17:19    


 


AST  15 U/L (13-39)   18  17:19    


 


ALT  13 U/L (7-52)   18  17:19    


 


Alkaline Phosphatase  72 U/L ()   18  17:19    


 


Troponin I  0.01 ng/mL (0.01-0.05)   18  17:19    


 


Total Protein  7.6 gm/dL (6.0-8.3)   18  17:19    


 


Albumin  4.0 gm/dL (3.7-5.3)   18  17:19    


 


Globulin  3.6 gm/dL  18  17:19    


 


Albumin/Globulin Ratio  1.1  (1.0-1.8)   18  17:19    


 


Triglycerides  189 mg/dL (<150)  H  18  06:05    


 


Cholesterol  200 mg/dL (<200)   18  06:05    


 


LDL Cholesterol Direct  143 mg/dL ()   18  06:05    


 


HDL Cholesterol  40 mg/dL (23-92)   18  06:05    


 


TSH  0.48 uIU/ml (0.34-5.60)   18  17:19    


 


Salicylates  < 25.0 mg/L (30.0-100.0)  L  18  17:19    


 


Acetaminophen  < 10.0 ug/mL (10.0-30.0)  L  18  17:19    


 


Ethyl Alcohol  < 10 mg/dL (0-10)   18  17:19    


 


RPR  NONREACTIVE  (NONREACTIVE)   18  17:19    














- Physical Exam


Vitals and I&O: 


 Vital Signs











Temp  97.7 F   11/15/18 14:00


 


Pulse  81   11/15/18 14:00


 


Resp  20   11/15/18 14:00


 


BP  139/58   11/15/18 14:00


 


Pulse Ox  96   11/15/18 14:00








 Intake & Output











 11/15/18 11/15/18 11/16/18





 06:59 18:59 06:59


 


Intake Total 120 1200 240


 


Balance 120 1200 240


 


Intake:   


 


  Oral 120 1200 240


 


Other:   


 


  # Voids 3  2


 


  # Bowel Movements  1 











Active Medications: 


Current Medications





Acetaminophen (Tylenol)  650 mg PO Q4HR PRN


   PRN Reason: Mild Pain / Temp above 100


   Stop: 19 20:40


   Last Admin: 18 17:00 Dose:  650 mg


Al Hydrox/Mg Hydrox/Simethicone (Maalox)  30 ml PO Q4HR PRN


   PRN Reason: GI DISTRESS


   Stop: 19 20:40


Amlodipine Besylate (Norvasc)  10 mg PO DAILY [facility]


   Stop: 19 08:59


   Last Admin: 11/15/18 09:08 Dose:  10 mg


Calamine/Phenol (Calmoseptine)  1 appl TP PRN PRN


   PRN Reason: IAD 


   Stop: 19 05:59


   Last Admin: 11/10/18 21:02 Dose:  1 appl


Docusate Sodium (Colace)  100 mg PO BID [facility]


   Stop: 19 08:59


   Last Admin: 11/15/18 16:48 Dose:  100 mg


Heparin Sodium (Porcine) (Heparin)  5,000 units SUBQ Q12HR [facility]


   Stop: 19 21:59


   Last Admin: 11/15/18 09:08 Dose:  5,000 units


Insulin Aspart (Novolog Insulin Sliding Scale)  0 units SUBQ ACHS [facility]; Protocol


   Stop: 19 20:59


   Last Admin: 11/15/18 16:48 Dose:  Not Given


Lorazepam (Ativan)  0.5 mg PO Q4H PRN; Protocol


   PRN Reason: Anxiety


   Stop: 19 20:34


   Last Admin: 11/15/18 09:27 Dose:  0.5 mg


Magnesium Hydroxide (Milk Of Magnesia)  30 ml PO Q4H PRN


   PRN Reason: Constipation


   Stop: 19 20:34


Metformin HCl (Glucophage)  500 mg PO BID [facility]


   Stop: 19 08:59


   Last Admin: 11/15/18 16:48 Dose:  500 mg


Mirtazapine (Remeron)  7.5 mg PO HS [facility]; Protocol


   Stop: 19 20:59


   Last Admin: 18 20:49 Dose:  7.5 mg


Multivitamins/Vitamin C (Theragran)  1 tab PO DAILY [facility]


   Stop: 19 08:59


   Last Admin: 11/15/18 09:08 Dose:  1 tab


Quetiapine Fumarate (Seroquel)  100 mg PO HS [facility]; Protocol


   Stop: 19 20:59


   Last Admin: 18 20:48 Dose:  100 mg


Quetiapine Fumarate (Seroquel)  100 mg PO BID [facility]


   Stop: 19 16:59


   Last Admin: 11/15/18 16:51 Dose:  100 mg


Zolpidem Tartrate (Ambien)  5 mg PO HS PRN


   PRN Reason: Insomnia


   Stop: 19 20:40


   Last Admin: 11/15/18 00:22 Dose:  5 mg








General: demented


HEENT: NC/AT, PERRLA, EOMI, anicteric sclerae, throat clear


Neck: Supple, No JVD, No thyromegaly, +2 carotid pulse wo bruit, No LAD, + JVD


Cardiovascular: Normal S1, Normal S2, without murmur


Abdomen: soft, non-tender, non-distended


Extremities: clear


Neurological: no change





Internal Medicine Assmt/Plan





- Assessment


Assessment: 





1.DM.


2.HTN.


3.HYPERLIPIDEMIA.


4.DEMENTIA.





- Plan


Plan: 





CONTINUE ON CURRENT MEDICATION AND DIET.





Nutritional Asmnt/Malnutr-PDOC





- Dietary Evaluation


Malnutrition Findings (Please click <Entered> for more info): 








Nutritional Asmnt/Malnutrition                             Start:  18 12:

46


Text:                                                      Status: Complete    

  


Freq:                                                                          

  


Protocol:                                                                      

  


 Document     18 12:46  MARCO  (Rec: 18 12:55  MARCO [email or web address])


 Nutritional Asmnt/Malnutrition


     Patient General Information


      Nutritional Screening                      Moderate Risk


      Diagnosis                                  psychosis


      Pertinent Medical Hx/Surgical Hx           HTN, DM, hyperlipidemia,


                                                 dementia, bipolar disorder,


                                                 psychosis, PUD/GERD, ESRD


      Subjective Information                     Pt sleeping at time of visit.


                                                 Nursing noted PO intake: ~75%.


      Current Diet Order/ Nutrition Support      [facility] soft chopped, PAULIE, CCHO


      Pertinent Medications                      colace, heparin, novolog,


                                                 metformin, remeron, theragran,


                                                 seroquel


      Pertinent Labs                             : -126


                                                 : -129


                                                  glucose 118


     Nutritional Hx/Data


      Height                                     1.7 m


      Height (Calculated Centimeters)            170.2


      Current Weight (lbs)                       77.111 kg


      Weight (Calculated Kilograms)              77.1


      Weight (Calculated Grams)                  16816.7


      Ideal Body Weight                          135 lb


      Body Mass Index (BMI)                      26.6


      Weight Status                              Overweight


     GI Symptoms


      GI Symptoms                                None


      Last BM                                    


      Difficult in:                              None


      Food Allergies                             No


      Skin Integrity/Comment:                    intact, glenn 17


      Current %PO                                Good (%)


     Estimated Nutritional Goals


      BEE in Kcals:                              Using Current wt


      Calories/Kcals/Kg                          23-27


      Kcals Calculated                           7354-9709


      Protein:                                   Using Current wt


      Protein g/k.8-1


      Protein Calculated                         62-77 g


      Fluid: ml                                  7416-2464 (1 ml/kcal)


     Nutritional Problem


      1. Problem


       Problem                                   altered nutrition related lab


                                                 values


       Etiology                                  hyperglycemia, endocrine


                                                 dysfunction


       Signs/Symptoms:                           -126


     Malnutrition Alert


      Is there a minimum of two criteria         No


       selected?                                 


       Query Text:Check all the applicable       


       criteria. A minimum of two criteria are   


       recommended for diagnosis of either       


       severe or non-severe malnutrition.        


     Malnutrition Related to Morbid Obesity


      Malnutrition related to morbid obesity     No


     Intervention/Recommendation


      Comments                                   1. Continue with [facility] soft


                                                 chopped, PUALIE, CCHO diet as


                                                 ordered


                                                 2. Monitor PO intake, wt, labs


                                                 and skin integrity


                                                 3. F/U as moderate risk in 3-5


                                                 days, -14


     Expected Outcomes/Goals


      Expected Outcomes/Goals                    1. PO intake to meet at least


                                                 75% of all meals


                                                 2. Wt stability, skin to


                                                 remain intact, and nutrition


                                                 related labs to approach


                                                 normal limits


                                                 Reviewed by Annette Whyte RD

## 2018-11-16 RX ADMIN — INSULIN ASPART SCH: 100 INJECTION, SOLUTION INTRAVENOUS; SUBCUTANEOUS at 17:03

## 2018-11-16 RX ADMIN — INSULIN ASPART SCH UNITS: 100 INJECTION, SOLUTION INTRAVENOUS; SUBCUTANEOUS at 20:42

## 2018-11-16 RX ADMIN — INSULIN ASPART SCH UNITS: 100 INJECTION, SOLUTION INTRAVENOUS; SUBCUTANEOUS at 12:02

## 2018-11-16 RX ADMIN — INSULIN ASPART SCH: 100 INJECTION, SOLUTION INTRAVENOUS; SUBCUTANEOUS at 06:46

## 2018-11-16 NOTE — PROGRESS NOTES
DATE:  11/15/2018



Case was discussed with staff of the patient.  The patient continues to be

agitated, continues to be unable to make safe plan for self-care.  Continues to

have poor insight and unable to make safe plan for self-care or participate in a

meaningful conversation.  Continues to need redirection and I will be increasing

her Seroquel dose to 100 mg twice a day to help improve her agitation, psychosis

and no side effects with the medication, no sedation, no nausea, no

extrapyramidal symptoms.  I will continue to work with the patient in group

therapy, milieu therapy, and adjust the medication as needed.





DD: 11/15/2018 12:41

DT: 11/16/2018 01:09

JOB# [id number]  [id number]

## 2018-11-16 NOTE — INTERNAL MEDICINE PROG NOTE
Internal Medicine Subjective





- Subjective


Service Date: 18


Patient seen and examined:: with staff


Patient is:: awake, verbal, in bed, talking, confused


Per staff patient has:: no adverse event





Internal Medicine Objective





- Results


Result Diagrams: 


 18 17:19





 18 17:19


Recent Labs: 


 Laboratory Last Values











WBC  12.3 Th/cmm (4.8-10.8)  H  18  17:19    


 


RBC  3.82 Mil/cmm (3.80-5.20)   18  17:19    


 


Hgb  12.1 gm/dL (12-16)   18  17:19    


 


Hct  36.8 % (41.0-60)  L  18  17:19    


 


MCV  96.4 fl ()   18  17:19    


 


MCH  31.7 pg (27.0-31.0)  H  18  17:19    


 


MCHC Differential  32.9 pg (28.0-36.0)   18  17:19    


 


RDW  14.3 % (11.5-20.0)   18  17:19    


 


Plt Count  375 Th/cmm (150-400)   18  17:19    


 


MPV  9.2 fl  18  17:19    


 


Neutrophils %  50.0 % (40.0-80.0)   18  17:19    


 


Lymphocytes %  39.5 % (20.0-50.0)   18  17:19    


 


Monocytes %  6.3 % (2.0-10.0)   18  17:19    


 


Eosinophils %  3.2 % (0.0-5.0)   18  17:19    


 


Basophils %  1.0 % (0.0-2.0)   18  17:19    


 


Sodium  142 mEq/L (136-145)   18  17:19    


 


Potassium  3.6 mEq/L (3.5-5.1)   18  17:19    


 


Chloride  106 mEq/L ()   18  17:19    


 


Carbon Dioxide  26.2 mEq/L (21.0-31.0)   18  17:19    


 


Anion Gap  13.4  (7.0-16.0)   18  17:19    


 


BUN  25 mg/dL (7-25)   18  17:19    


 


Creatinine  1.0 mg/dL (0.6-1.2)   18  17:19    


 


Est GFR ( Amer)  TNP   18  17:19    


 


Est GFR (Non-Af Amer)  TNP   18  17:19    


 


BUN/Creatinine Ratio  25.0   18  17:19    


 


Glucose  118 mg/dL ()  H  18  17:19    


 


POC Glucose  159 MG/DL (70 - 105)  H  18  11:45    


 


Calcium  9.9 mg/dL (8.6-10.3)   18  17:19    


 


Total Bilirubin  0.3 mg/dL (0.3-1.0)   18  17:19    


 


AST  15 U/L (13-39)   18  17:19    


 


ALT  13 U/L (7-52)   18  17:19    


 


Alkaline Phosphatase  72 U/L ()   18  17:19    


 


Troponin I  0.01 ng/mL (0.01-0.05)   18  17:19    


 


Total Protein  7.6 gm/dL (6.0-8.3)   18  17:19    


 


Albumin  4.0 gm/dL (3.7-5.3)   18  17:19    


 


Globulin  3.6 gm/dL  18  17:19    


 


Albumin/Globulin Ratio  1.1  (1.0-1.8)   18  17:19    


 


Triglycerides  189 mg/dL (<150)  H  18  06:05    


 


Cholesterol  200 mg/dL (<200)   18  06:05    


 


LDL Cholesterol Direct  143 mg/dL ()   18  06:05    


 


HDL Cholesterol  40 mg/dL (23-92)   18  06:05    


 


TSH  0.48 uIU/ml (0.34-5.60)   18  17:19    


 


Salicylates  < 25.0 mg/L (30.0-100.0)  L  18  17:19    


 


Acetaminophen  < 10.0 ug/mL (10.0-30.0)  L  18  17:19    


 


Ethyl Alcohol  < 10 mg/dL (0-10)   18  17:19    


 


RPR  NONREACTIVE  (NONREACTIVE)   18  17:19    














- Physical Exam


Vitals and I&O: 


 Vital Signs











Temp  97.5 F   18 14:00


 


Pulse  60   18 14:00


 


Resp  20   18 14:00


 


BP  142/68   18 14:00


 


Pulse Ox  98   18 14:00








 Intake & Output











 11/15/18 11/16/18 11/16/18





 18:59 06:59 18:59


 


Intake Total 1200 240 


 


Balance 1200 240 


 


Intake:   


 


  Oral 1200 240 


 


Other:   


 


  # Voids  2 


 


  # Bowel Movements 1  











Active Medications: 


Current Medications





Acetaminophen (Tylenol)  650 mg PO Q4HR PRN


   PRN Reason: Mild Pain / Temp above 100


   Stop: 19 20:40


   Last Admin: 18 17:00 Dose:  650 mg


Al Hydrox/Mg Hydrox/Simethicone (Maalox)  30 ml PO Q4HR PRN


   PRN Reason: GI DISTRESS


   Stop: 19 20:40


Amlodipine Besylate (Norvasc)  10 mg PO DAILY [facility]


   Stop: 19 08:59


   Last Admin: 18 09:01 Dose:  10 mg


Calamine/Phenol (Calmoseptine)  1 appl TP PRN PRN


   PRN Reason: IAD 


   Stop: 19 05:59


   Last Admin: 11/10/18 21:02 Dose:  1 appl


Docusate Sodium (Colace)  100 mg PO BID [facility]


   Stop: 19 08:59


   Last Admin: 18 09:01 Dose:  100 mg


Heparin Sodium (Porcine) (Heparin)  5,000 units SUBQ Q12HR [facility]


   Stop: 19 21:59


   Last Admin: 18 09:01 Dose:  5,000 units


Insulin Aspart (Novolog Insulin Sliding Scale)  0 units SUBQ ACHS [facility]; Protocol


   Stop: 19 20:59


   Last Admin: 18 12:02 Dose:  2 units


Lorazepam (Ativan)  0.5 mg PO Q4H PRN; Protocol


   PRN Reason: Anxiety


   Stop: 19 20:34


   Last Admin: 18 09:33 Dose:  0.5 mg


Magnesium Hydroxide (Milk Of Magnesia)  30 ml PO Q4H PRN


   PRN Reason: Constipation


   Stop: 19 20:34


Metformin HCl (Glucophage)  500 mg PO BID [facility]


   Stop: 19 08:59


   Last Admin: 18 09:01 Dose:  500 mg


Mirtazapine (Remeron)  7.5 mg PO HS [facility]; Protocol


   Stop: 19 20:59


   Last Admin: 11/15/18 21:11 Dose:  7.5 mg


Multivitamins/Vitamin C (Theragran)  1 tab PO DAILY [facility]


   Stop: 19 08:59


   Last Admin: 18 09:01 Dose:  1 tab


Quetiapine Fumarate (Seroquel)  100 mg PO HS [facility]; Protocol


   Stop: 19 20:59


   Last Admin: 11/15/18 21:12 Dose:  100 mg


Quetiapine Fumarate (Seroquel)  100 mg PO BID [facility]


   Stop: 19 16:59


   Last Admin: 18 09:04 Dose:  100 mg


Zolpidem Tartrate (Ambien)  5 mg PO HS PRN


   PRN Reason: Insomnia


   Stop: 19 20:40


   Last Admin: 11/15/18 23:04 Dose:  5 mg








General: demented


HEENT: NC/AT, PERRLA, EOMI, anicteric sclerae, throat clear


Neck: Supple, No JVD, No thyromegaly, +2 carotid pulse wo bruit, No LAD, + JVD


Cardiovascular: Normal S1, Normal S2, without murmur


Abdomen: soft, non-tender, non-distended


Extremities: clear


Neurological: no change





Internal Medicine Assmt/Plan





- Assessment


Assessment: 





1.DM.


2.HTN.


3.HYPERLIPIDEMIA.


4.DEMENTIA.





- Plan


Plan: 





CONTINUE ON CURRENT MEDICATION AND DIET.





Nutritional Asmnt/Malnutr-PDOC





- Dietary Evaluation


Malnutrition Findings (Please click <Entered> for more info): 








Nutritional Asmnt/Malnutrition                             Start:  18 12:

46


Text:                                                      Status: Complete    

  


Freq:                                                                          

  


Protocol:                                                                      

  


 Document     18 12:46  MARCO  (Rec: 18 12:55  DYANG  [email or web address])


 Nutritional Asmnt/Malnutrition


     Patient General Information


      Nutritional Screening                      Moderate Risk


      Diagnosis                                  psychosis


      Pertinent Medical Hx/Surgical Hx           HTN, DM, hyperlipidemia,


                                                 dementia, bipolar disorder,


                                                 psychosis, PUD/GERD, ESRD


      Subjective Information                     Pt sleeping at time of visit.


                                                 Nursing noted PO intake: ~75%.


      Current Diet Order/ Nutrition Support      [facility] soft chopped, PAULIE, CCHO


      Pertinent Medications                      colace, heparin, novolog,


                                                 metformin, remeron, theragran,


                                                 seroquel


      Pertinent Labs                             : -126


                                                 : -129


                                                  glucose 118


     Nutritional Hx/Data


      Height                                     1.7 m


      Height (Calculated Centimeters)            170.2


      Current Weight (lbs)                       77.111 kg


      Weight (Calculated Kilograms)              77.1


      Weight (Calculated Grams)                  90265.7


      Ideal Body Weight                          135 lb


      Body Mass Index (BMI)                      26.6


      Weight Status                              Overweight


     GI Symptoms


      GI Symptoms                                None


      Last BM                                    


      Difficult in:                              None


      Food Allergies                             No


      Skin Integrity/Comment:                    intact, glenn 17


      Current %PO                                Good (%)


     Estimated Nutritional Goals


      BEE in Kcals:                              Using Current wt


      Calories/Kcals/Kg                          23-27


      Kcals Calculated                           4847-7382


      Protein:                                   Using Current wt


      Protein g/k.8-1


      Protein Calculated                         62-77 g


      Fluid: ml                                  4711-1317 (1 ml/kcal)


     Nutritional Problem


      1. Problem


       Problem                                   altered nutrition related lab


                                                 values


       Etiology                                  hyperglycemia, endocrine


                                                 dysfunction


       Signs/Symptoms:                           -126


     Malnutrition Alert


      Is there a minimum of two criteria         No


       selected?                                 


       Query Text:Check all the applicable       


       criteria. A minimum of two criteria are   


       recommended for diagnosis of either       


       severe or non-severe malnutrition.        


     Malnutrition Related to Morbid Obesity


      Malnutrition related to morbid obesity     No


     Intervention/Recommendation


      Comments                                   1. Continue with [facility] soft


                                                 chopped, PAULIE, CCHO diet as


                                                 ordered


                                                 2. Monitor PO intake, wt, labs


                                                 and skin integrity


                                                 3. F/U as moderate risk in 3-5


                                                 days, -


     Expected Outcomes/Goals


      Expected Outcomes/Goals                    1. PO intake to meet at least


                                                 75% of all meals


                                                 2. Wt stability, skin to


                                                 remain intact, and nutrition


                                                 related labs to approach


                                                 normal limits


                                                 Reviewed by Annette Whyte RD

## 2018-11-17 RX ADMIN — INSULIN ASPART SCH: 100 INJECTION, SOLUTION INTRAVENOUS; SUBCUTANEOUS at 12:00

## 2018-11-17 RX ADMIN — INSULIN ASPART SCH: 100 INJECTION, SOLUTION INTRAVENOUS; SUBCUTANEOUS at 21:00

## 2018-11-17 RX ADMIN — INSULIN ASPART SCH: 100 INJECTION, SOLUTION INTRAVENOUS; SUBCUTANEOUS at 06:42

## 2018-11-17 RX ADMIN — INSULIN ASPART SCH UNITS: 100 INJECTION, SOLUTION INTRAVENOUS; SUBCUTANEOUS at 17:30

## 2018-11-17 NOTE — PROGRESS NOTES
DATE:  11/17/2018



Case was discussed with staff of the patient, reviewed records.  The patient

continues to have episodes of feeling screaming till yesterday.  Continues to be

unpredictable, impulsive, needing redirection.  Continues to have poor insight. 

She is also demented, confused.  I will be adding Depakote to her medication as

the Seroquel despite increasing ____ has not been very helpful.  No side effects

with the medication, no sedation, no nausea, no extrapyramidal symptoms.  I will

be adding ____ 125 mg twice a day.  Discussed side effects.  We will continue to

work with the patient in group therapy, milieu therapy, and adjust the

medication as needed.





DD: 11/17/2018 12:50

DT: 11/17/2018 23:03

JOB# [id number]  [id number]

## 2018-11-17 NOTE — PROGRESS NOTES
DATE:  11/16/2018



SUBJECTIVE:  Case was discussed with staff of the patient, reviewed records. 

The patient is reported to be a bit calmer.  I have been increasing her

medication almost on a daily basis with the last adjustment being yesterday as I

increased her Seroquel to 100 mg twice a day.  She also took 100 mg at bedtime

and today she is a bit sedated, so I cannot really make any adjustments, waiting

for her to see how she will be doing after she has been taking this and working

on discharge plan as well and will continue outpatient group therapy, milieu

therapy, adjust medication as needed.





DD: 11/16/2018 11:57

DT: 11/17/2018 00:21

JOB# [id number]  [id number]

## 2018-11-17 NOTE — INTERNAL MEDICINE PROG NOTE
Internal Medicine Subjective





- Subjective


Service Date: 18


Patient seen and examined:: with staff


Patient is:: awake, verbal, in bed, talking, confused


Per staff patient has:: no adverse event





Internal Medicine Objective





- Results


Result Diagrams: 


 18 17:19





 18 17:19


Recent Labs: 


 Laboratory Last Values











WBC  12.3 Th/cmm (4.8-10.8)  H  18  17:19    


 


RBC  3.82 Mil/cmm (3.80-5.20)   18  17:19    


 


Hgb  12.1 gm/dL (12-16)   18  17:19    


 


Hct  36.8 % (41.0-60)  L  18  17:19    


 


MCV  96.4 fl ()   18  17:19    


 


MCH  31.7 pg (27.0-31.0)  H  18  17:19    


 


MCHC Differential  32.9 pg (28.0-36.0)   18  17:19    


 


RDW  14.3 % (11.5-20.0)   18  17:19    


 


Plt Count  375 Th/cmm (150-400)   18  17:19    


 


MPV  9.2 fl  18  17:19    


 


Neutrophils %  50.0 % (40.0-80.0)   18  17:19    


 


Lymphocytes %  39.5 % (20.0-50.0)   18  17:19    


 


Monocytes %  6.3 % (2.0-10.0)   18  17:19    


 


Eosinophils %  3.2 % (0.0-5.0)   18  17:19    


 


Basophils %  1.0 % (0.0-2.0)   18  17:19    


 


Sodium  142 mEq/L (136-145)   18  17:19    


 


Potassium  3.6 mEq/L (3.5-5.1)   18  17:19    


 


Chloride  106 mEq/L ()   18  17:19    


 


Carbon Dioxide  26.2 mEq/L (21.0-31.0)   18  17:19    


 


Anion Gap  13.4  (7.0-16.0)   18  17:19    


 


BUN  25 mg/dL (7-25)   18  17:19    


 


Creatinine  1.0 mg/dL (0.6-1.2)   18  17:19    


 


Est GFR ( Amer)  TNP   18  17:19    


 


Est GFR (Non-Af Amer)  TNP   18  17:19    


 


BUN/Creatinine Ratio  25.0   18  17:19    


 


Glucose  118 mg/dL ()  H  18  17:19    


 


POC Glucose  153 MG/DL (70 - 105)  H  18  16:47    


 


Calcium  9.9 mg/dL (8.6-10.3)   18  17:19    


 


Total Bilirubin  0.3 mg/dL (0.3-1.0)   18  17:19    


 


AST  15 U/L (13-39)   18  17:19    


 


ALT  13 U/L (7-52)   18  17:19    


 


Alkaline Phosphatase  72 U/L ()   18  17:19    


 


Troponin I  0.01 ng/mL (0.01-0.05)   18  17:19    


 


Total Protein  7.6 gm/dL (6.0-8.3)   18  17:19    


 


Albumin  4.0 gm/dL (3.7-5.3)   18  17:19    


 


Globulin  3.6 gm/dL  18  17:19    


 


Albumin/Globulin Ratio  1.1  (1.0-1.8)   18  17:19    


 


Triglycerides  189 mg/dL (<150)  H  18  06:05    


 


Cholesterol  200 mg/dL (<200)   18  06:05    


 


LDL Cholesterol Direct  143 mg/dL ()   18  06:05    


 


HDL Cholesterol  40 mg/dL (23-92)   18  06:05    


 


TSH  0.48 uIU/ml (0.34-5.60)   18  17:19    


 


Salicylates  < 25.0 mg/L (30.0-100.0)  L  18  17:19    


 


Acetaminophen  < 10.0 ug/mL (10.0-30.0)  L  18  17:19    


 


Ethyl Alcohol  < 10 mg/dL (0-10)   18  17:19    


 


RPR  NONREACTIVE  (NONREACTIVE)   18  17:19    














- Physical Exam


Vitals and I&O: 


 Vital Signs











Temp  97.6 F   18 14:00


 


Pulse  79   18 14:00


 


Resp  20   18 14:00


 


BP  156/76   18 14:00


 


Pulse Ox  96   18 14:00








 Intake & Output











 18





 18:59 06:59 18:59


 


Intake Total 1200 120 960


 


Balance 1200 120 960


 


Intake:   


 


  Oral 1200 120 960


 


Other:   


 


  # Voids  3 3


 


  # Bowel Movements 1 0 1











Active Medications: 


Current Medications





Acetaminophen (Tylenol)  650 mg PO Q4HR PRN


   PRN Reason: Mild Pain / Temp above 100


   Stop: 19 20:40


   Last Admin: 18 17:00 Dose:  650 mg


Al Hydrox/Mg Hydrox/Simethicone (Maalox)  30 ml PO Q4HR PRN


   PRN Reason: GI DISTRESS


   Stop: 19 20:40


Amlodipine Besylate (Norvasc)  10 mg PO DAILY [facility]


   Stop: 19 08:59


   Last Admin: 18 09:36 Dose:  10 mg


Calamine/Phenol (Calmoseptine)  1 appl TP PRN PRN


   PRN Reason: IAD 


   Stop: 19 05:59


   Last Admin: 11/10/18 21:02 Dose:  1 appl


Docusate Sodium (Colace)  100 mg PO BID [facility]


   Stop: 19 08:59


   Last Admin: 18 17:29 Dose:  100 mg


Heparin Sodium (Porcine) (Heparin)  5,000 units SUBQ Q12HR [facility]


   Stop: 19 21:59


   Last Admin: 18 09:35 Dose:  5,000 units


Insulin Aspart (Novolog Insulin Sliding Scale)  0 units SUBQ ACHS [facility]; Protocol


   Stop: 19 20:59


   Last Admin: 18 17:30 Dose:  2 units


Lorazepam (Ativan)  0.5 mg PO Q4H PRN; Protocol


   PRN Reason: Anxiety


   Stop: 19 20:34


   Last Admin: 18 15:52 Dose:  0.5 mg


Magnesium Hydroxide (Milk Of Magnesia)  30 ml PO Q4H PRN


   PRN Reason: Constipation


   Stop: 19 20:34


Metformin HCl (Glucophage)  500 mg PO BID [facility]


   Stop: 19 08:59


   Last Admin: 18 17:29 Dose:  500 mg


Mirtazapine (Remeron)  7.5 mg PO HS [facility]; Protocol


   Stop: 19 20:59


   Last Admin: 18 20:39 Dose:  7.5 mg


Multivitamins/Vitamin C (Theragran)  1 tab PO DAILY [facility]


   Stop: 19 08:59


   Last Admin: 18 09:35 Dose:  1 tab


Quetiapine Fumarate (Seroquel)  100 mg PO HS [facility]; Protocol


   Stop: 19 20:59


   Last Admin: 18 20:38 Dose:  100 mg


Quetiapine Fumarate (Seroquel)  100 mg PO BID [facility]


   Stop: 19 16:59


   Last Admin: 18 17:29 Dose:  100 mg


Valproate Sodium (Depakene)  125 mg PO BID [facility]; Protocol


   Stop: 19 16:59


   Last Admin: 18 17:28 Dose:  125 mg


Zolpidem Tartrate (Ambien)  5 mg PO HS PRN


   PRN Reason: Insomnia


   Stop: 19 20:40


   Last Admin: 18 03:45 Dose:  5 mg








General: demented


HEENT: NC/AT, PERRLA, EOMI, anicteric sclerae, throat clear


Neck: Supple, No JVD, No thyromegaly, +2 carotid pulse wo bruit, No LAD, + JVD


Cardiovascular: Normal S1, Normal S2, without murmur


Abdomen: soft, non-tender, non-distended


Extremities: clear


Neurological: no change





Internal Medicine Assmt/Plan





- Assessment


Assessment: 





1.DM.


2.HTN.


3.HYPERLIPIDEMIA.


4.DEMENTIA.





- Plan


Plan: 





CONTINUE ON CURRENT MEDICATION AND DIET.





Nutritional Asmnt/Malnutr-PDOC





- Dietary Evaluation


Malnutrition Findings (Please click <Entered> for more info): 








Nutritional Asmnt/Malnutrition                             Start:  18 12:

46


Text:                                                      Status: Complete    

  


Freq:                                                                          

  


Protocol:                                                                      

  


 Document     18 12:46  MARCO  (Rec: 18 12:55  MARCO [email or web address])


 Nutritional Asmnt/Malnutrition


     Patient General Information


      Nutritional Screening                      Moderate Risk


      Diagnosis                                  psychosis


      Pertinent Medical Hx/Surgical Hx           HTN, DM, hyperlipidemia,


                                                 dementia, bipolar disorder,


                                                 psychosis, PUD/GERD, ESRD


      Subjective Information                     Pt sleeping at time of visit.


                                                 Nursing noted PO intake: ~75%.


      Current Diet Order/ Nutrition Support      [facility] soft chopped, PAULIE, CCHO


      Pertinent Medications                      colace, heparin, novolog,


                                                 metformin, remeron, theragran,


                                                 seroquel


      Pertinent Labs                             : -126


                                                 : -129


                                                  glucose 118


     Nutritional Hx/Data


      Height                                     1.7 m


      Height (Calculated Centimeters)            170.2


      Current Weight (lbs)                       77.111 kg


      Weight (Calculated Kilograms)              77.1


      Weight (Calculated Grams)                  33384.7


      Ideal Body Weight                          135 lb


      Body Mass Index (BMI)                      26.6


      Weight Status                              Overweight


     GI Symptoms


      GI Symptoms                                None


      Last BM                                    


      Difficult in:                              None


      Food Allergies                             No


      Skin Integrity/Comment:                    intact, glenn 17


      Current %PO                                Good (%)


     Estimated Nutritional Goals


      BEE in Kcals:                              Using Current wt


      Calories/Kcals/Kg                          23-27


      Kcals Calculated                           0147-5564


      Protein:                                   Using Current wt


      Protein g/k.8-1


      Protein Calculated                         62-77 g


      Fluid: ml                                  9404-4022 (1 ml/kcal)


     Nutritional Problem


      1. Problem


       Problem                                   altered nutrition related lab


                                                 values


       Etiology                                  hyperglycemia, endocrine


                                                 dysfunction


       Signs/Symptoms:                           -126


     Malnutrition Alert


      Is there a minimum of two criteria         No


       selected?                                 


       Query Text:Check all the applicable       


       criteria. A minimum of two criteria are   


       recommended for diagnosis of either       


       severe or non-severe malnutrition.        


     Malnutrition Related to Morbid Obesity


      Malnutrition related to morbid obesity     No


     Intervention/Recommendation


      Comments                                   1. Continue with [facility] soft


                                                 chopped, PAULIE, CCHO diet as


                                                 ordered


                                                 2. Monitor PO intake, wt, labs


                                                 and skin integrity


                                                 3. F/U as moderate risk in 3-5


                                                 days, -


     Expected Outcomes/Goals


      Expected Outcomes/Goals                    1. PO intake to meet at least


                                                 75% of all meals


                                                 2. Wt stability, skin to


                                                 remain intact, and nutrition


                                                 related labs to approach


                                                 normal limits


                                                 Reviewed by Annette Wyhte RD

## 2018-11-18 RX ADMIN — INSULIN ASPART SCH: 100 INJECTION, SOLUTION INTRAVENOUS; SUBCUTANEOUS at 20:13

## 2018-11-18 RX ADMIN — INSULIN ASPART SCH UNITS: 100 INJECTION, SOLUTION INTRAVENOUS; SUBCUTANEOUS at 06:36

## 2018-11-18 RX ADMIN — INSULIN ASPART SCH: 100 INJECTION, SOLUTION INTRAVENOUS; SUBCUTANEOUS at 11:27

## 2018-11-18 RX ADMIN — INSULIN ASPART SCH UNITS: 100 INJECTION, SOLUTION INTRAVENOUS; SUBCUTANEOUS at 16:00

## 2018-11-18 NOTE — PROGRESS NOTES
DATE:  11/18/2018



Case was discussed with staff of the patient, reviewed records.  The patient was

yelling and screaming today; however, I have added Depakote yesterday to her

medication and the Seroquel has been increased almost on a daily basis; however,

the staff not tell me that she did not get her morning dose of Seroquel, so I

advised the staff to go ahead and give it to her now.  The patient continues to

be unpredictable and impulsive.  Continues to have mood swings, irritability. 

Probably, I will go ahead and change the Depakote to 250 though she has been

taking a 24-hour.  She is only yelling and screaming.  No side effects with the

medications.  No sedation or nausea.  No extrapyramidal symptoms.  We will

continue to work with the patient in group therapy, milieu therapy, adjust

medications as needed.





DD: 11/18/2018 12:40

DT: 11/18/2018 13:28

JOB# [id number]  [id number]

## 2018-11-18 NOTE — INTERNAL MEDICINE PROG NOTE
Internal Medicine Subjective





- Subjective


Service Date: 18


Patient seen and examined:: with staff


Patient is:: awake, verbal, in bed, talking, confused


Per staff patient has:: no adverse event





Internal Medicine Objective





- Results


Result Diagrams: 


 18 17:19





 18 17:19


Recent Labs: 


 Laboratory Last Values











WBC  12.3 Th/cmm (4.8-10.8)  H  18  17:19    


 


RBC  3.82 Mil/cmm (3.80-5.20)   18  17:19    


 


Hgb  12.1 gm/dL (12-16)   18  17:19    


 


Hct  36.8 % (41.0-60)  L  18  17:19    


 


MCV  96.4 fl ()   18  17:19    


 


MCH  31.7 pg (27.0-31.0)  H  18  17:19    


 


MCHC Differential  32.9 pg (28.0-36.0)   18  17:19    


 


RDW  14.3 % (11.5-20.0)   18  17:19    


 


Plt Count  375 Th/cmm (150-400)   18  17:19    


 


MPV  9.2 fl  18  17:19    


 


Neutrophils %  50.0 % (40.0-80.0)   18  17:19    


 


Lymphocytes %  39.5 % (20.0-50.0)   18  17:19    


 


Monocytes %  6.3 % (2.0-10.0)   18  17:19    


 


Eosinophils %  3.2 % (0.0-5.0)   18  17:19    


 


Basophils %  1.0 % (0.0-2.0)   18  17:19    


 


Sodium  142 mEq/L (136-145)   18  17:19    


 


Potassium  3.6 mEq/L (3.5-5.1)   18  17:19    


 


Chloride  106 mEq/L ()   18  17:19    


 


Carbon Dioxide  26.2 mEq/L (21.0-31.0)   18  17:19    


 


Anion Gap  13.4  (7.0-16.0)   18  17:19    


 


BUN  25 mg/dL (7-25)   18  17:19    


 


Creatinine  1.0 mg/dL (0.6-1.2)   18  17:19    


 


Est GFR ( Amer)  TNP   18  17:19    


 


Est GFR (Non-Af Amer)  TNP   18  17:19    


 


BUN/Creatinine Ratio  25.0   18  17:19    


 


Glucose  118 mg/dL ()  H  18  17:19    


 


POC Glucose  179 MG/DL (70 - 105)  H  18  15:56    


 


Calcium  9.9 mg/dL (8.6-10.3)   18  17:19    


 


Total Bilirubin  0.3 mg/dL (0.3-1.0)   18  17:19    


 


AST  15 U/L (13-39)   18  17:19    


 


ALT  13 U/L (7-52)   18  17:19    


 


Alkaline Phosphatase  72 U/L ()   18  17:19    


 


Troponin I  0.01 ng/mL (0.01-0.05)   18  17:19    


 


Total Protein  7.6 gm/dL (6.0-8.3)   18  17:19    


 


Albumin  4.0 gm/dL (3.7-5.3)   18  17:19    


 


Globulin  3.6 gm/dL  18  17:19    


 


Albumin/Globulin Ratio  1.1  (1.0-1.8)   18  17:19    


 


Triglycerides  189 mg/dL (<150)  H  18  06:05    


 


Cholesterol  200 mg/dL (<200)   18  06:05    


 


LDL Cholesterol Direct  143 mg/dL ()   18  06:05    


 


HDL Cholesterol  40 mg/dL (23-92)   18  06:05    


 


TSH  0.48 uIU/ml (0.34-5.60)   18  17:19    


 


Salicylates  < 25.0 mg/L (30.0-100.0)  L  18  17:19    


 


Acetaminophen  < 10.0 ug/mL (10.0-30.0)  L  18  17:19    


 


Ethyl Alcohol  < 10 mg/dL (0-10)   18  17:19    


 


RPR  NONREACTIVE  (NONREACTIVE)   18  17:19    














- Physical Exam


Vitals and I&O: 


 Vital Signs











Temp  97.4 F   18 14:00


 


Pulse  74   18 14:00


 


Resp  18   18 14:00


 


BP  118/74   18 14:00


 


Pulse Ox  96   18 14:00








 Intake & Output











 18





 18:59 06:59 18:59


 


Intake Total 960  


 


Balance 960  


 


Intake:   


 


  Oral 960  


 


Other:   


 


  # Voids 3  


 


  # Bowel Movements 1  











Active Medications: 


Current Medications





Acetaminophen (Tylenol)  650 mg PO Q4HR PRN


   PRN Reason: Mild Pain / Temp above 100


   Stop: 19 20:40


   Last Admin: 18 17:00 Dose:  650 mg


Al Hydrox/Mg Hydrox/Simethicone (Maalox)  30 ml PO Q4HR PRN


   PRN Reason: GI DISTRESS


   Stop: 19 20:40


Amlodipine Besylate (Norvasc)  10 mg PO DAILY [facility]


   Stop: 19 08:59


   Last Admin: 18 10:11 Dose:  Not Given


Calamine/Phenol (Calmoseptine)  1 appl TP PRN PRN


   PRN Reason: IAD 


   Stop: 19 05:59


   Last Admin: 11/10/18 21:02 Dose:  1 appl


Docusate Sodium (Colace)  100 mg PO BID [facility]


   Stop: 19 08:59


   Last Admin: 18 16:51 Dose:  100 mg


Heparin Sodium (Porcine) (Heparin)  5,000 units SUBQ Q12HR [facility]


   Stop: 19 21:59


   Last Admin: 18 09:51 Dose:  5,000 units


Insulin Aspart (Novolog Insulin Sliding Scale)  0 units SUBQ ACHS [facility]; Protocol


   Stop: 19 20:59


   Last Admin: 18 16:00 Dose:  2 units


Lorazepam (Ativan)  0.5 mg PO Q4H PRN; Protocol


   PRN Reason: Anxiety


   Stop: 19 20:34


   Last Admin: 18 11:56 Dose:  0.5 mg


Magnesium Hydroxide (Milk Of Magnesia)  30 ml PO Q4H PRN


   PRN Reason: Constipation


   Stop: 19 20:34


Metformin HCl (Glucophage)  500 mg PO BID [facility]


   Stop: 19 08:59


   Last Admin: 18 16:51 Dose:  500 mg


Mirtazapine (Remeron)  7.5 mg PO HS [facility]; Protocol


   Stop: 19 20:59


   Last Admin: 18 21:01 Dose:  7.5 mg


Multivitamins/Vitamin C (Theragran)  1 tab PO DAILY [facility]


   Stop: 19 08:59


   Last Admin: 18 10:12 Dose:  Not Given


Quetiapine Fumarate (Seroquel)  100 mg PO HS [facility]; Protocol


   Stop: 19 20:59


   Last Admin: 18 21:01 Dose:  100 mg


Quetiapine Fumarate (Seroquel)  100 mg PO BID [facility]


   Stop: 19 16:59


   Last Admin: 18 16:51 Dose:  100 mg


Valproate Sodium (Depakene)  250 mg PO BID [facility]; Protocol


   Stop: 19 16:59


   Last Admin: 18 16:50 Dose:  250 mg


Zolpidem Tartrate (Ambien)  5 mg PO HS PRN


   PRN Reason: Insomnia


   Stop: 19 20:40


   Last Admin: 18 23:50 Dose:  5 mg








General: demented


HEENT: NC/AT, PERRLA, EOMI, anicteric sclerae, throat clear


Neck: Supple, No JVD, No thyromegaly, +2 carotid pulse wo bruit, No LAD, + JVD


Cardiovascular: Normal S1, Normal S2, without murmur


Abdomen: soft, non-tender, non-distended


Extremities: clear


Neurological: no change





Internal Medicine Assmt/Plan





- Assessment


Assessment: 





1.DM.


2.HTN.


3.HYPERLIPIDEMIA.


4.DEMENTIA.





- Plan


Plan: 





CONTINUE ON CURRENT MEDICATION AND DIET.





Nutritional Asmnt/Malnutr-PDOC





- Dietary Evaluation


Malnutrition Findings (Please click <Entered> for more info): 








Nutritional Asmnt/Malnutrition                             Start:  18 12:

46


Text:                                                      Status: Complete    

  


Freq:                                                                          

  


Protocol:                                                                      

  


 Document     18 12:46  MARCO  (Rec: 18 12:55  MARCO  [email or web address])


 Nutritional Asmnt/Malnutrition


     Patient General Information


      Nutritional Screening                      Moderate Risk


      Diagnosis                                  psychosis


      Pertinent Medical Hx/Surgical Hx           HTN, DM, hyperlipidemia,


                                                 dementia, bipolar disorder,


                                                 psychosis, PUD/GERD, ESRD


      Subjective Information                     Pt sleeping at time of visit.


                                                 Nursing noted PO intake: ~75%.


      Current Diet Order/ Nutrition Support      [facility] soft chopped, PAULIE, CCHO


      Pertinent Medications                      colace, heparin, novolog,


                                                 metformin, remeron, theragran,


                                                 seroquel


      Pertinent Labs                             : -126


                                                 : -129


                                                  glucose 118


     Nutritional Hx/Data


      Height                                     1.7 m


      Height (Calculated Centimeters)            170.2


      Current Weight (lbs)                       77.111 kg


      Weight (Calculated Kilograms)              77.1


      Weight (Calculated Grams)                  21930.7


      Ideal Body Weight                          135 lb


      Body Mass Index (BMI)                      26.6


      Weight Status                              Overweight


     GI Symptoms


      GI Symptoms                                None


      Last BM                                    


      Difficult in:                              None


      Food Allergies                             No


      Skin Integrity/Comment:                    intact, glenn 17


      Current %PO                                Good (%)


     Estimated Nutritional Goals


      BEE in Kcals:                              Using Current wt


      Calories/Kcals/Kg                          23-27


      Kcals Calculated                           6315-1775


      Protein:                                   Using Current wt


      Protein g/k.8-1


      Protein Calculated                         62-77 g


      Fluid: ml                                  2563-7230 (1 ml/kcal)


     Nutritional Problem


      1. Problem


       Problem                                   altered nutrition related lab


                                                 values


       Etiology                                  hyperglycemia, endocrine


                                                 dysfunction


       Signs/Symptoms:                           -126


     Malnutrition Alert


      Is there a minimum of two criteria         No


       selected?                                 


       Query Text:Check all the applicable       


       criteria. A minimum of two criteria are   


       recommended for diagnosis of either       


       severe or non-severe malnutrition.        


     Malnutrition Related to Morbid Obesity


      Malnutrition related to morbid obesity     No


     Intervention/Recommendation


      Comments                                   1. Continue with [facility] soft


                                                 chopped, PAULIE, CCHO diet as


                                                 ordered


                                                 2. Monitor PO intake, wt, labs


                                                 and skin integrity


                                                 3. F/U as moderate risk in 3-5


                                                 days, -


     Expected Outcomes/Goals


      Expected Outcomes/Goals                    1. PO intake to meet at least


                                                 75% of all meals


                                                 2. Wt stability, skin to


                                                 remain intact, and nutrition


                                                 related labs to approach


                                                 normal limits


                                                 Reviewed by Annette Whyte RD

## 2018-11-19 RX ADMIN — INSULIN ASPART SCH UNITS: 100 INJECTION, SOLUTION INTRAVENOUS; SUBCUTANEOUS at 21:38

## 2018-11-19 RX ADMIN — INSULIN ASPART SCH: 100 INJECTION, SOLUTION INTRAVENOUS; SUBCUTANEOUS at 06:34

## 2018-11-19 RX ADMIN — INSULIN ASPART SCH: 100 INJECTION, SOLUTION INTRAVENOUS; SUBCUTANEOUS at 17:09

## 2018-11-19 RX ADMIN — INSULIN ASPART SCH: 100 INJECTION, SOLUTION INTRAVENOUS; SUBCUTANEOUS at 16:18

## 2018-11-19 NOTE — INTERNAL MEDICINE PROG NOTE
Internal Medicine Subjective





- Subjective


Service Date: 18


Patient seen and examined:: with staff


Patient is:: awake, verbal, in bed, talking, confused


Per staff patient has:: no adverse event





Internal Medicine Objective





- Results


Result Diagrams: 


 18 17:19





 18 17:19


Recent Labs: 


 Laboratory Last Values











WBC  12.3 Th/cmm (4.8-10.8)  H  18  17:19    


 


RBC  3.82 Mil/cmm (3.80-5.20)   18  17:19    


 


Hgb  12.1 gm/dL (12-16)   18  17:19    


 


Hct  36.8 % (41.0-60)  L  18  17:19    


 


MCV  96.4 fl ()   18  17:19    


 


MCH  31.7 pg (27.0-31.0)  H  18  17:19    


 


MCHC Differential  32.9 pg (28.0-36.0)   18  17:19    


 


RDW  14.3 % (11.5-20.0)   18  17:19    


 


Plt Count  375 Th/cmm (150-400)   18  17:19    


 


MPV  9.2 fl  18  17:19    


 


Neutrophils %  50.0 % (40.0-80.0)   18  17:19    


 


Lymphocytes %  39.5 % (20.0-50.0)   18  17:19    


 


Monocytes %  6.3 % (2.0-10.0)   18  17:19    


 


Eosinophils %  3.2 % (0.0-5.0)   18  17:19    


 


Basophils %  1.0 % (0.0-2.0)   18  17:19    


 


Sodium  142 mEq/L (136-145)   18  17:19    


 


Potassium  3.6 mEq/L (3.5-5.1)   18  17:19    


 


Chloride  106 mEq/L ()   18  17:19    


 


Carbon Dioxide  26.2 mEq/L (21.0-31.0)   18  17:19    


 


Anion Gap  13.4  (7.0-16.0)   18  17:19    


 


BUN  25 mg/dL (7-25)   18  17:19    


 


Creatinine  1.0 mg/dL (0.6-1.2)   18  17:19    


 


Est GFR ( Amer)  TNP   18  17:19    


 


Est GFR (Non-Af Amer)  TNP   18  17:19    


 


BUN/Creatinine Ratio  25.0   18  17:19    


 


Glucose  118 mg/dL ()  H  18  17:19    


 


POC Glucose  135 MG/DL (70 - 105)  H  18  06:30    


 


Calcium  9.9 mg/dL (8.6-10.3)   18  17:19    


 


Total Bilirubin  0.3 mg/dL (0.3-1.0)   18  17:19    


 


AST  15 U/L (13-39)   18  17:19    


 


ALT  13 U/L (7-52)   18  17:19    


 


Alkaline Phosphatase  72 U/L ()   18  17:19    


 


Troponin I  0.01 ng/mL (0.01-0.05)   18  17:19    


 


Total Protein  7.6 gm/dL (6.0-8.3)   18  17:19    


 


Albumin  4.0 gm/dL (3.7-5.3)   18  17:19    


 


Globulin  3.6 gm/dL  18  17:19    


 


Albumin/Globulin Ratio  1.1  (1.0-1.8)   18  17:19    


 


Triglycerides  189 mg/dL (<150)  H  18  06:05    


 


Cholesterol  200 mg/dL (<200)   18  06:05    


 


LDL Cholesterol Direct  143 mg/dL ()   18  06:05    


 


HDL Cholesterol  40 mg/dL (23-92)   18  06:05    


 


TSH  0.48 uIU/ml (0.34-5.60)   18  17:19    


 


Salicylates  < 25.0 mg/L (30.0-100.0)  L  18  17:19    


 


Acetaminophen  < 10.0 ug/mL (10.0-30.0)  L  18  17:19    


 


Ethyl Alcohol  < 10 mg/dL (0-10)   18  17:19    


 


RPR  NONREACTIVE  (NONREACTIVE)   18  17:19    














- Physical Exam


Vitals and I&O: 


 Vital Signs











Temp  98.0 F   18 14:00


 


Pulse  97   18 14:00


 


Resp  20   18 14:00


 


BP  105/56   18 14:00


 


Pulse Ox  97   18 14:00








 Intake & Output











 18





 06:59 18:59 06:59


 


Intake Total 120 700 60


 


Balance 120 700 60


 


Intake:   


 


  Oral 120 700 60


 


Other:   


 


  # Voids 3 3 1


 


  # Bowel Movements  0 1











Active Medications: 


Current Medications





Acetaminophen (Tylenol)  650 mg PO Q4HR PRN


   PRN Reason: Mild Pain / Temp above 100


   Stop: 19 20:40


   Last Admin: 18 17:00 Dose:  650 mg


Al Hydrox/Mg Hydrox/Simethicone (Maalox)  30 ml PO Q4HR PRN


   PRN Reason: GI DISTRESS


   Stop: 19 20:40


Amlodipine Besylate (Norvasc)  10 mg PO DAILY Pending sale to [facility]


   Stop: 19 08:59


   Last Admin: 18 08:38 Dose:  10 mg


Calamine/Phenol (Calmoseptine)  1 appl TP PRN PRN


   PRN Reason: IAD 


   Stop: 19 05:59


   Last Admin: 11/10/18 21:02 Dose:  1 appl


Docusate Sodium (Colace)  100 mg PO BID Pending sale to [facility]


   Stop: 19 08:59


   Last Admin: 18 17:09 Dose:  Not Given


Heparin Sodium (Porcine) (Heparin)  5,000 units SUBQ Q12HR Pending sale to [facility]


   Stop: 19 21:59


   Last Admin: 18 08:38 Dose:  5,000 units


Insulin Aspart (Novolog Insulin Sliding Scale)  0 units SUBQ ACHS Pending sale to [facility]; Protocol


   Stop: 19 20:59


   Last Admin: 18 17:09 Dose:  Not Given


Lorazepam (Ativan)  0.5 mg PO Q4H PRN; Protocol


   PRN Reason: Anxiety


   Stop: 19 20:34


   Last Admin: 18 11:56 Dose:  0.5 mg


Magnesium Hydroxide (Milk Of Magnesia)  30 ml PO Q4H PRN


   PRN Reason: Constipation


   Stop: 19 20:34


Metformin HCl (Glucophage)  500 mg PO BID Pending sale to [facility]


   Stop: 19 08:59


   Last Admin: 18 17:09 Dose:  Not Given


Mirtazapine (Remeron)  7.5 mg PO HS Pending sale to [facility]; Protocol


   Stop: 19 20:59


   Last Admin: 18 20:10 Dose:  7.5 mg


Multivitamins/Vitamin C (Theragran)  1 tab PO DAILY Pending sale to [facility]


   Stop: 19 08:59


   Last Admin: 18 08:38 Dose:  1 tab


Quetiapine Fumarate (Seroquel)  100 mg PO HS Pending sale to [facility]; Protocol


   Stop: 19 20:59


   Last Admin: 18 20:10 Dose:  100 mg


Quetiapine Fumarate 100 mg/ (Quetiapine Fumarate 25 mg)  125 mg PO BID Pending sale to [facility]


   Stop: 19 16:59


   Last Admin: 18 17:09 Dose:  Not Given


Valproate Sodium (Depakene)  250 mg PO BID Pending sale to [facility]; Protocol


   Stop: 19 16:59


   Last Admin: 18 17:09 Dose:  Not Given


Zolpidem Tartrate (Ambien)  5 mg PO HS PRN


   PRN Reason: Insomnia


   Stop: 19 20:40


   Last Admin: 18 20:10 Dose:  5 mg








General: demented


HEENT: NC/AT, PERRLA, EOMI, anicteric sclerae, throat clear


Neck: Supple, No JVD, No thyromegaly, +2 carotid pulse wo bruit, No LAD, + JVD


Cardiovascular: Normal S1, Normal S2, without murmur


Abdomen: soft, non-tender, non-distended


Extremities: clear


Neurological: no change





Internal Medicine Assmt/Plan





- Assessment


Assessment: 





1.DM.


2.HTN.


3.HYPERLIPIDEMIA.


4.DEMENTIA.





- Plan


Plan: 





CONTINUE ON CURRENT MEDICATION AND DIET.





Nutritional Asmnt/Malnutr-PDOC





- Dietary Evaluation


Malnutrition Findings (Please click <Entered> for more info): 








Nutritional Asmnt/Malnutrition                             Start:  18 12:

46


Text:                                                      Status: Complete    

  


Freq:                                                                          

  


Protocol:                                                                      

  


 Document     18 12:46  MARCO  (Rec: 18 12:55  MARCO [id number])


 Nutritional Asmnt/Malnutrition


     Patient General Information


      Nutritional Screening                      Moderate Risk


      Diagnosis                                  psychosis


      Pertinent Medical Hx/Surgical Hx           HTN, DM, hyperlipidemia,


                                                 dementia, bipolar disorder,


                                                 psychosis, PUD/GERD, ESRD


      Subjective Information                     Pt sleeping at time of visit.


                                                 Nursing noted PO intake: ~75%.


      Current Diet Order/ Nutrition Support      [facility] soft chopped, PAULIE, CCHO


      Pertinent Medications                      colace, heparin, novolog,


                                                 metformin, remeron, theragran,


                                                 seroquel


      Pertinent Labs                             : -126


                                                 : -129


                                                  glucose 118


     Nutritional Hx/Data


      Height                                     1.7 m


      Height (Calculated Centimeters)            170.2


      Current Weight (lbs)                       77.111 kg


      Weight (Calculated Kilograms)              77.1


      Weight (Calculated Grams)                  86349.7


      Ideal Body Weight                          135 lb


      Body Mass Index (BMI)                      26.6


      Weight Status                              Overweight


     GI Symptoms


      GI Symptoms                                None


      Last BM                                    


      Difficult in:                              None


      Food Allergies                             No


      Skin Integrity/Comment:                    intact, glenn 17


      Current %PO                                Good (%)


     Estimated Nutritional Goals


      BEE in Kcals:                              Using Current wt


      Calories/Kcals/Kg                          23-27


      Kcals Calculated                           1489-0503


      Protein:                                   Using Current wt


      Protein g/k.8-1


      Protein Calculated                         62-77 g


      Fluid: ml                                  4792-5255 (1 ml/kcal)


     Nutritional Problem


      1. Problem


       Problem                                   altered nutrition related lab


                                                 values


       Etiology                                  hyperglycemia, endocrine


                                                 dysfunction


       Signs/Symptoms:                           -126


     Malnutrition Alert


      Is there a minimum of two criteria         No


       selected?                                 


       Query Text:Check all the applicable       


       criteria. A minimum of two criteria are   


       recommended for diagnosis of either       


       severe or non-severe malnutrition.        


     Malnutrition Related to Morbid Obesity


      Malnutrition related to morbid obesity     No


     Intervention/Recommendation


      Comments                                   1. Continue with [facility] soft


                                                 chopped, PAULIE, CCHO diet as


                                                 ordered


                                                 2. Monitor PO intake, wt, labs


                                                 and skin integrity


                                                 3. F/U as moderate risk in 3-5


                                                 days, -


     Expected Outcomes/Goals


      Expected Outcomes/Goals                    1. PO intake to meet at least


                                                 75% of all meals


                                                 2. Wt stability, skin to


                                                 remain intact, and nutrition


                                                 related labs to approach


                                                 normal limits


                                                 Reviewed by Annette Whyte RD

## 2018-11-19 NOTE — PROGRESS NOTES
DATE:  11/19/2018



Case was discussed with staff of the patient, reviewed records.  The patient

continues to be yelling and screaming.  Continues to have poor insight. 

Continues to be unable to make safe plan for self-care, unpredictable,

impulsive, yelling and screaming for no apparent reason.  I did increase the

Depakote dose yesterday.  Her Seroquel dose has been increased on a daily basis.

 I will be making further adjustment to the Seroquel to make it 125 mg twice a

day and so far no side effects with the medication, no sedation, no nausea, no

extrapyramidal symptoms.  We will continue to work with the patient in group

therapy, milieu therapy, and adjust the medication as needed.





DD: 11/19/2018 12:19

DT: 11/19/2018 22:49

JOB# [id number]  [id number]

## 2018-11-20 RX ADMIN — INSULIN ASPART SCH: 100 INJECTION, SOLUTION INTRAVENOUS; SUBCUTANEOUS at 11:17

## 2018-11-20 RX ADMIN — INSULIN ASPART SCH: 100 INJECTION, SOLUTION INTRAVENOUS; SUBCUTANEOUS at 06:57

## 2018-11-20 RX ADMIN — INSULIN ASPART SCH: 100 INJECTION, SOLUTION INTRAVENOUS; SUBCUTANEOUS at 17:21

## 2018-11-20 NOTE — DISCHARGE SUMMARY
DATE OF DISCHARGE:  11/20/2018



IDENTIFYING INFORMATION:  The patient is a 72-year-old female.



CHIEF COMPLAINT:  Admitted on a hold.



HISTORY OF PRESENT ILLNESS:  The patient was yelling and screaming.  She was

confused, making nonsensical statements, refusing to sign voluntary admission. 

She has been noncompliant with her medication.  The patient needs total care,

very agitated, unable to state discharge plan for self-care and hard to

redirect, had to be medicated with the history of dementia and bipolar disorder.

 The patient well-known case.  I have been seeing her at [facility].  The

patient has been Remeron 75 mg, Seroquel 25 mg twice a day and 100 mg at

bedtime.



COURSE IN THE HOSPITAL:  The patient was continued on medication.  Seroquel dose

was increased over the course of her stay to 125 at bedtime, 125 twice a day and

added Depakote 250 mg twice a day because her agitation, yelling and screaming. 

Also Remeron was added 7.5 mg at bedtime, that she is on and patient continue

with amlodipine and insulin.  The patient also was performed on multivitamin. 

The patient continues to be yelling, screaming, most of the time, but the last 2

days she was here, she was no longer yelling and screaming.  She had to be fed

by the staff.  She was sleeping well, eating well.  So that point, we felt the

patient was ready to go to a lesser level of care as the patient was in a

nursing home her medication can be adjusted there.  Dr. Dr. Mays in the care of

her medical condition while she is here.



FINAL DIAGNOSES:  Psychosis, not otherwise specified, bipolar disorder with

psychosis; dementia.



MEDICAL DIAGNOSES:  Hypertension, diabetes mellitus.  The patient will be going

back to [facility], will follow up with the psychiatrist, primary care

physician there.



EXPECTED OUTCOME:  Stable if the patient complies with the above.





DD: 11/20/2018 12:27

DT: 11/20/2018 16:04

[facility]# [id number]  [id number]